# Patient Record
Sex: FEMALE | Race: WHITE | NOT HISPANIC OR LATINO | URBAN - METROPOLITAN AREA
[De-identification: names, ages, dates, MRNs, and addresses within clinical notes are randomized per-mention and may not be internally consistent; named-entity substitution may affect disease eponyms.]

---

## 2020-04-22 ENCOUNTER — LAB REQUISITION (OUTPATIENT)
Dept: LAB | Facility: HOSPITAL | Age: 85
End: 2020-04-22

## 2020-04-22 DIAGNOSIS — Z00.00 ROUTINE GENERAL MEDICAL EXAMINATION AT A HEALTH CARE FACILITY: ICD-10-CM

## 2020-04-22 LAB
ALBUMIN SERPL-MCNC: 3.6 G/DL (ref 3.5–5.2)
ALBUMIN/GLOB SERPL: 1.6 G/DL
ALP SERPL-CCNC: 85 U/L (ref 39–117)
ALT SERPL W P-5'-P-CCNC: 10 U/L (ref 1–33)
ANION GAP SERPL CALCULATED.3IONS-SCNC: 12.6 MMOL/L (ref 5–15)
AST SERPL-CCNC: 16 U/L (ref 1–32)
BASOPHILS # BLD AUTO: 0.08 10*3/MM3 (ref 0–0.2)
BASOPHILS NFR BLD AUTO: 1.1 % (ref 0–1.5)
BILIRUB SERPL-MCNC: 0.4 MG/DL (ref 0.2–1.2)
BUN BLD-MCNC: 22 MG/DL (ref 8–23)
BUN/CREAT SERPL: 31.9 (ref 7–25)
CALCIUM SPEC-SCNC: 8.8 MG/DL (ref 8.6–10.5)
CHLORIDE SERPL-SCNC: 99 MMOL/L (ref 98–107)
CO2 SERPL-SCNC: 25.4 MMOL/L (ref 22–29)
CREAT BLD-MCNC: 0.69 MG/DL (ref 0.57–1)
D DIMER PPP FEU-MCNC: 0.93 MCGFEU/ML (ref 0–0.49)
DEPRECATED RDW RBC AUTO: 42.7 FL (ref 37–54)
EOSINOPHIL # BLD AUTO: 0.36 10*3/MM3 (ref 0–0.4)
EOSINOPHIL NFR BLD AUTO: 4.8 % (ref 0.3–6.2)
ERYTHROCYTE [DISTWIDTH] IN BLOOD BY AUTOMATED COUNT: 12.9 % (ref 12.3–15.4)
GFR SERPL CREATININE-BSD FRML MDRD: 81 ML/MIN/1.73
GLOBULIN UR ELPH-MCNC: 2.2 GM/DL
GLUCOSE BLD-MCNC: 140 MG/DL (ref 65–99)
HCT VFR BLD AUTO: 35.7 % (ref 34–46.6)
HGB BLD-MCNC: 12.2 G/DL (ref 12–15.9)
IMM GRANULOCYTES # BLD AUTO: 0.02 10*3/MM3 (ref 0–0.05)
IMM GRANULOCYTES NFR BLD AUTO: 0.3 % (ref 0–0.5)
LYMPHOCYTES # BLD AUTO: 2.12 10*3/MM3 (ref 0.7–3.1)
LYMPHOCYTES NFR BLD AUTO: 28.1 % (ref 19.6–45.3)
MCH RBC QN AUTO: 30.7 PG (ref 26.6–33)
MCHC RBC AUTO-ENTMCNC: 34.2 G/DL (ref 31.5–35.7)
MCV RBC AUTO: 89.9 FL (ref 79–97)
MONOCYTES # BLD AUTO: 0.53 10*3/MM3 (ref 0.1–0.9)
MONOCYTES NFR BLD AUTO: 7 % (ref 5–12)
NEUTROPHILS # BLD AUTO: 4.43 10*3/MM3 (ref 1.7–7)
NEUTROPHILS NFR BLD AUTO: 58.7 % (ref 42.7–76)
NRBC BLD AUTO-RTO: 0 /100 WBC (ref 0–0.2)
PLATELET # BLD AUTO: 269 10*3/MM3 (ref 140–450)
PMV BLD AUTO: 9.4 FL (ref 6–12)
POTASSIUM BLD-SCNC: 3.9 MMOL/L (ref 3.5–5.2)
PROT SERPL-MCNC: 5.8 G/DL (ref 6–8.5)
RBC # BLD AUTO: 3.97 10*6/MM3 (ref 3.77–5.28)
SODIUM BLD-SCNC: 137 MMOL/L (ref 136–145)
WBC NRBC COR # BLD: 7.54 10*3/MM3 (ref 3.4–10.8)

## 2020-04-22 PROCEDURE — 85025 COMPLETE CBC W/AUTO DIFF WBC: CPT

## 2020-04-22 PROCEDURE — 85379 FIBRIN DEGRADATION QUANT: CPT

## 2020-04-22 PROCEDURE — 80053 COMPREHEN METABOLIC PANEL: CPT

## 2024-02-29 ENCOUNTER — OFFICE VISIT (OUTPATIENT)
Dept: FAMILY MEDICINE CLINIC | Facility: CLINIC | Age: 89
End: 2024-02-29
Payer: MEDICARE

## 2024-02-29 VITALS
HEART RATE: 76 BPM | OXYGEN SATURATION: 92 % | HEIGHT: 62 IN | DIASTOLIC BLOOD PRESSURE: 78 MMHG | SYSTOLIC BLOOD PRESSURE: 142 MMHG

## 2024-02-29 DIAGNOSIS — F03.B0 MODERATE DEMENTIA WITHOUT BEHAVIORAL DISTURBANCE, PSYCHOTIC DISTURBANCE, MOOD DISTURBANCE, OR ANXIETY, UNSPECIFIED DEMENTIA TYPE: ICD-10-CM

## 2024-02-29 DIAGNOSIS — J43.9 PULMONARY EMPHYSEMA, UNSPECIFIED EMPHYSEMA TYPE: ICD-10-CM

## 2024-02-29 DIAGNOSIS — I10 PRIMARY HYPERTENSION: ICD-10-CM

## 2024-02-29 DIAGNOSIS — S31.000A WOUND OF SACRAL REGION, INITIAL ENCOUNTER: Primary | ICD-10-CM

## 2024-02-29 DIAGNOSIS — L89.301 PRESSURE INJURY OF BUTTOCK, STAGE 1, UNSPECIFIED LATERALITY: ICD-10-CM

## 2024-02-29 PROBLEM — G30.1 SEVERE LATE ONSET ALZHEIMER'S DEMENTIA WITHOUT BEHAVIORAL DISTURBANCE, PSYCHOTIC DISTURBANCE, MOOD DISTURBANCE, OR ANXIETY: Status: ACTIVE | Noted: 2024-02-29

## 2024-02-29 PROBLEM — K21.9 GASTROESOPHAGEAL REFLUX DISEASE WITHOUT ESOPHAGITIS: Status: ACTIVE | Noted: 2024-02-29

## 2024-02-29 PROBLEM — F02.C0 SEVERE LATE ONSET ALZHEIMER'S DEMENTIA WITHOUT BEHAVIORAL DISTURBANCE, PSYCHOTIC DISTURBANCE, MOOD DISTURBANCE, OR ANXIETY: Status: ACTIVE | Noted: 2024-02-29

## 2024-02-29 PROBLEM — J44.9 COPD (CHRONIC OBSTRUCTIVE PULMONARY DISEASE): Status: ACTIVE | Noted: 2024-02-29

## 2024-02-29 RX ORDER — POLYETHYLENE GLYCOL 3350 17 G/17G
17 POWDER, FOR SOLUTION ORAL DAILY
COMMUNITY

## 2024-02-29 RX ORDER — ACETAMINOPHEN 325 MG/1
650 TABLET ORAL EVERY 6 HOURS PRN
COMMUNITY

## 2024-02-29 RX ORDER — POLYVINYL ALCOHOL 14 MG/ML
1 SOLUTION/ DROPS OPHTHALMIC AS NEEDED
COMMUNITY

## 2024-02-29 RX ORDER — ALBUTEROL SULFATE 90 UG/1
2 AEROSOL, METERED RESPIRATORY (INHALATION) EVERY 4 HOURS PRN
COMMUNITY
End: 2024-02-29 | Stop reason: SDUPTHER

## 2024-02-29 RX ORDER — TIMOLOL 5.12 MG/ML
1 SOLUTION/ DROPS OPHTHALMIC DAILY
Qty: 5 ML | Refills: 2 | Status: SHIPPED | OUTPATIENT
Start: 2024-02-29

## 2024-02-29 RX ORDER — VERAPAMIL HYDROCHLORIDE 80 MG/1
1 TABLET ORAL 3 TIMES DAILY
COMMUNITY
Start: 2024-01-19 | End: 2024-02-29 | Stop reason: SDUPTHER

## 2024-02-29 RX ORDER — DIVALPROEX SODIUM 250 MG/1
250 TABLET, DELAYED RELEASE ORAL
COMMUNITY
End: 2024-02-29

## 2024-02-29 RX ORDER — ASPIRIN 81 MG/1
TABLET, CHEWABLE ORAL
COMMUNITY

## 2024-02-29 RX ORDER — VIT C/E/ZN/COPPR/LUTEIN/ZEAXAN 250MG-90MG
1 CAPSULE ORAL
Qty: 180 TABLET | Refills: 2 | Status: SHIPPED | OUTPATIENT
Start: 2024-02-29

## 2024-02-29 RX ORDER — MULTIVIT WITH MINERALS/LUTEIN
250 TABLET ORAL DAILY
COMMUNITY

## 2024-02-29 RX ORDER — DICLOFENAC SODIUM 30 MG/G
2 GEL TOPICAL 3 TIMES DAILY
Qty: 60 G | Refills: 2 | Status: SHIPPED | OUTPATIENT
Start: 2024-02-29

## 2024-02-29 RX ORDER — KETOCONAZOLE 20 MG/G
CREAM TOPICAL
COMMUNITY
Start: 2023-11-06 | End: 2024-02-29 | Stop reason: SDUPTHER

## 2024-02-29 RX ORDER — LORATADINE 10 MG/1
10 TABLET ORAL DAILY
Qty: 30 TABLET | Refills: 2 | Status: SHIPPED | OUTPATIENT
Start: 2024-02-29

## 2024-02-29 RX ORDER — LORATADINE 10 MG/1
10 TABLET ORAL DAILY
COMMUNITY
End: 2024-02-29 | Stop reason: SDUPTHER

## 2024-02-29 RX ORDER — TIMOLOL 5.12 MG/ML
1 SOLUTION/ DROPS OPHTHALMIC
COMMUNITY
End: 2024-02-29 | Stop reason: SDUPTHER

## 2024-02-29 RX ORDER — FAMOTIDINE 20 MG/1
20 TABLET, FILM COATED ORAL DAILY
Qty: 90 TABLET | Refills: 2 | Status: SHIPPED | OUTPATIENT
Start: 2024-02-29

## 2024-02-29 RX ORDER — GABAPENTIN 100 MG/1
CAPSULE ORAL
COMMUNITY
End: 2024-02-29

## 2024-02-29 RX ORDER — DICLOFENAC SODIUM 30 MG/G
2 GEL TOPICAL
COMMUNITY
End: 2024-02-29 | Stop reason: SDUPTHER

## 2024-02-29 RX ORDER — UMECLIDINIUM BROMIDE AND VILANTEROL TRIFENATATE 62.5; 25 UG/1; UG/1
1 POWDER RESPIRATORY (INHALATION) DAILY
Qty: 1 EACH | Refills: 9 | Status: SHIPPED | OUTPATIENT
Start: 2024-02-29

## 2024-02-29 RX ORDER — ERGOCALCIFEROL (VITAMIN D2) 10 MCG
400 TABLET ORAL DAILY
COMMUNITY

## 2024-02-29 RX ORDER — UMECLIDINIUM BROMIDE AND VILANTEROL TRIFENATATE 62.5; 25 UG/1; UG/1
1 POWDER RESPIRATORY (INHALATION) DAILY
COMMUNITY
Start: 2024-02-15 | End: 2024-02-29 | Stop reason: SDUPTHER

## 2024-02-29 RX ORDER — ALBUTEROL SULFATE 90 UG/1
2 AEROSOL, METERED RESPIRATORY (INHALATION) EVERY 4 HOURS PRN
Qty: 18 G | Refills: 9 | Status: SHIPPED | OUTPATIENT
Start: 2024-02-29

## 2024-02-29 RX ORDER — GEL DRESSING 2" X 2"
1 BANDAGE TOPICAL DAILY
Qty: 60 EACH | Refills: 1 | Status: SHIPPED | OUTPATIENT
Start: 2024-02-29

## 2024-02-29 RX ORDER — ALBUTEROL SULFATE 2.5 MG/3ML
2.5 SOLUTION RESPIRATORY (INHALATION) EVERY 4 HOURS PRN
Qty: 120 EACH | Refills: 12 | Status: SHIPPED | OUTPATIENT
Start: 2024-02-29 | End: 2024-03-01 | Stop reason: SDUPTHER

## 2024-02-29 RX ORDER — VERAPAMIL HYDROCHLORIDE 80 MG/1
80 TABLET ORAL 3 TIMES DAILY
Qty: 90 TABLET | Refills: 2 | Status: SHIPPED | OUTPATIENT
Start: 2024-02-29

## 2024-02-29 RX ORDER — FAMOTIDINE 20 MG/1
20 TABLET, FILM COATED ORAL DAILY
COMMUNITY
End: 2024-02-29 | Stop reason: SDUPTHER

## 2024-02-29 RX ORDER — KETOCONAZOLE 20 MG/G
CREAM TOPICAL DAILY
Qty: 60 G | Refills: 2 | Status: SHIPPED | OUTPATIENT
Start: 2024-02-29

## 2024-02-29 RX ORDER — SIMETHICONE 80 MG
80 TABLET,CHEWABLE ORAL EVERY 6 HOURS PRN
COMMUNITY

## 2024-02-29 NOTE — PROGRESS NOTES
Kristine Hahn  1935  [id number]  Patient Care Team:  Juan Ramon Zepeda MD as PCP - General (Internal Medicine)    Kristine Hahn is a 88 y.o. female here today to establish care.  This patient is accompanied by their self who contributes to the history of their care.    Chief Complaint:    No chief complaint on file.       History of Present Illness:   She was recently was taken out of a nursing home. She has been in a nursing home since 2019. She was recently discharged from [facility] for pneumonia and uti. Was originally placed in  the nursing home for arthritis. She has been dx with dementia- per Dr. Fuentes. He has not been ambulatory for 5 years. She was taken off of zoloft by her family. She has been off of this for 1 week. He was taken off depakote for one week.   Family says she has been more alert and eating better off the medicatiosn and in new surrounding. She has been opn anoro and albuterol and has continued taking these.  Her caregiver indicates that she has a early pressure ulcer on her buttocks from being in the hospital in the nursing home.  They have applying cream.  She is in a wheelchair all days.  She indicates the skin is starting to slough.    Past Medical History:   Diagnosis Date    Anemia     Arthritis     Cancer     GERD (gastroesophageal reflux disease)     Glaucoma        History reviewed. No pertinent surgical history.     Family History   Problem Relation Age of Onset    Arthritis Mother     Arthritis Maternal Grandmother        Social History     Socioeconomic History    Marital status: Single   Tobacco Use    Smoking status: Never    Smokeless tobacco: Never   Vaping Use    Vaping Use: Never used   Substance and Sexual Activity    Alcohol use: Never    Drug use: Never    Sexual activity: Defer       Allergies   Allergen Reactions    Prednisone Unknown - Low Severity       Review of Systems:    Review of Systems   Constitutional: Negative.    Eyes: Negative.   "  Respiratory:  Positive for cough.    Genitourinary:         Wears diapers   Musculoskeletal:  Positive for arthralgias.   Skin:  Positive for skin lesions and bruise.   Neurological:  Positive for memory problem. Negative for confusion.        Does not ambulate   Psychiatric/Behavioral:  Negative for hallucinations and sleep disturbance.        Vitals:    02/29/24 1516   BP: 142/78   Pulse: 76   SpO2: 92%   Height: 157.5 cm (62\")     There is no height or weight on file to calculate BMI.      Current Outpatient Medications:     acetaminophen (TYLENOL) 325 MG tablet, Take 2 tablets by mouth Every 6 (Six) Hours As Needed for Mild Pain., Disp: , Rfl:     albuterol sulfate  (90 Base) MCG/ACT inhaler, Inhale 2 puffs Every 4 (Four) Hours As Needed for Wheezing., Disp: 18 g, Rfl: 9    aspirin 81 MG chewable tablet, Chew 1 tablet every day by oral route., Disp: , Rfl:     Diclofenac Sodium 3 % gel gel, Apply 2 g topically to the appropriate area as directed 3 (Three) Times a Day., Disp: 60 g, Rfl: 2    famotidine (PEPCID) 20 MG tablet, Take 1 tablet by mouth Daily., Disp: 90 tablet, Rfl: 2    iron polysacch complex-B12-FA (FERREX 150 FORTE) 150-0.025-1 MG capsule capsule, Take  by mouth Daily., Disp: , Rfl:     ketoconazole (NIZORAL) 2 % cream, Apply  topically to the appropriate area as directed Daily., Disp: 60 g, Rfl: 2    Latanoprost 0.005 % emulsion, Administer 1 drop to both eyes Daily., Disp: 5 mL, Rfl: 2    loratadine (CLARITIN) 10 MG tablet, Take 1 tablet by mouth Daily., Disp: 30 tablet, Rfl: 2    polyethylene glycol (MIRALAX) 17 g packet, Take 17 g by mouth Daily., Disp: , Rfl:     polyvinyl alcohol (LIQUIFILM) 1.4 % ophthalmic solution, 1 drop As Needed for Dry Eyes., Disp: , Rfl:     simethicone (MYLICON) 80 MG chewable tablet, Chew 1 tablet Every 6 (Six) Hours As Needed for Flatulence., Disp: , Rfl:     timolol (Betimol) 0.5 % ophthalmic solution, Administer 1 drop to both eyes Daily., Disp: 5 mL, " "Rfl: 2    Umeclidinium-Vilanterol (Anoro Ellipta) 62.5-25 MCG/ACT aerosol powder  inhaler, Inhale 1 puff Daily., Disp: 1 each, Rfl: 9    verapamil (CALAN) 80 MG tablet, Take 1 tablet by mouth 3 (Three) Times a Day., Disp: 90 tablet, Rfl: 2    vitamin C (ASCORBIC ACID) 250 MG tablet, Take 1 tablet by mouth Daily., Disp: , Rfl:     Vitamin D, Cholecalciferol, (CHOLECALCIFEROL) 10 MCG (400 UNIT) tablet, Take 1 tablet by mouth Daily., Disp: , Rfl:     albuterol (PROVENTIL) (2.5 MG/3ML) 0.083% nebulizer solution, Take 2.5 mg by nebulization Every 4 (Four) Hours As Needed for Wheezing., Disp: 120 each, Rfl: 12    Multiple Vitamins-Minerals (PreserVision AREDS 2) chewable tablet, Chew 1 each Daily With Breakfast & Dinner., Disp: 180 tablet, Rfl: 2    Wound Dressings (Medihoney Ca Alginate 2\"x2\") pads, Apply 1 patch topically Daily., Disp: 60 each, Rfl: 1    Physical Exam:    Physical Exam  Nursing note reviewed. Vitals reviewed: Chronically ill-appearing but not in distress.  Constitutional:       General: She is not in acute distress.     Appearance: She is well-developed. She is not diaphoretic.   HENT:      Head: Normocephalic and atraumatic.      Right Ear: External ear normal.      Left Ear: External ear normal.      Mouth/Throat:      Pharynx: No oropharyngeal exudate.   Eyes:      General: No scleral icterus.        Right eye: No discharge.      Conjunctiva/sclera: Conjunctivae normal.   Neck:      Thyroid: No thyromegaly.      Vascular: No JVD.      Trachea: No tracheal deviation.   Cardiovascular:      Rate and Rhythm: Normal rate and regular rhythm.      Heart sounds: Normal heart sounds.      Comments: PMI nondisplaced  Pulmonary:      Effort: Pulmonary effort is normal.      Breath sounds: Normal breath sounds. No wheezing or rales.      Comments: Prolonged expiratory phase  Abdominal:      General: Bowel sounds are normal.      Palpations: Abdomen is soft.      Tenderness: There is no abdominal tenderness. " "There is no guarding or rebound.   Musculoskeletal:      Cervical back: Normal range of motion and neck supple.      Comments: Lower extremity exam reveals extremely stiff almost contracted.  Bilateral knee tenderness medial lateral joint line.  No effusion.   Lymphadenopathy:      Cervical: No cervical adenopathy.   Skin:     General: Skin is warm and dry.      Capillary Refill: Capillary refill takes less than 2 seconds.      Coloration: Skin is not pale.      Findings: No rash.      Comments: Dorsum of wrist are slightly bruised from recent IVs.  Review of her buttocks revealed bilateral approximately a centimeter in diameter pressure sores.  Fibrinous exudate is present.  No cellulitic changes no foul odor.  This is bilateral.   Neurological:      Mental Status: She is alert.      Motor: No abnormal muscle tone.      Coordination: Coordination normal.      Comments: Perseverates with questions.  Formal mental status exam not taken   Psychiatric:         Judgment: Judgment normal.         Procedures    Results Review:    I reviewed the patient's new clinical results.  Reviewed discharge summary from [facility].    Assessment/Plan:    Problem List Items Addressed This Visit       COPD (chronic obstructive pulmonary disease)    Relevant Medications    Umeclidinium-Vilanterol (Anoro Ellipta) 62.5-25 MCG/ACT aerosol powder  inhaler    albuterol sulfate  (90 Base) MCG/ACT inhaler    loratadine (CLARITIN) 10 MG tablet    albuterol (PROVENTIL) (2.5 MG/3ML) 0.083% nebulizer solution    Other Relevant Orders    Ambulatory Referral to Home Health    Home Nebulizer    Primary hypertension    Relevant Medications    verapamil (CALAN) 80 MG tablet     Other Visit Diagnoses       Wound of sacral region, initial encounter    -  Primary    Relevant Medications    Wound Dressings (Medihoney Ca Alginate 2\"x2\") pads    Other Relevant Orders    Ambulatory Referral to Home Health    Ambulatory Referral to Case " Management Care Coordination, High Risk for ED/Readmission, Caregiving/Support    Pressure injury of buttock, stage 1, unspecified laterality        Relevant Orders    Ambulatory Referral to Home Health    Ambulatory Referral to Case Management Care Coordination, High Risk for ED/Readmission, Caregiving/Support    Moderate dementia without behavioral disturbance, psychotic disturbance, mood disturbance, or anxiety, unspecified dementia type        Relevant Orders    Ambulatory Referral to Case Management Care Coordination, High Risk for ED/Readmission, Caregiving/Support        That should offload her buttocks and turn every 2 hours.  Have set up home health for PT OT wound care and speech therapy.  Home nebulizer has been arranged.  Continue and have refilled her usual medicines.    Plan of care reviewed with patient at the conclusion of today's visit. Education was provided regarding diagnosis and management.  Patient verbalizes understanding of and agreement with management plan.    Return if symptoms worsen or fail to improve.    Juan Ramon Zepeda MD      Please note than portions of this note were completed [facility] a Voice Recognition Program

## 2024-03-01 ENCOUNTER — REFERRAL TRIAGE (OUTPATIENT)
Dept: CASE MANAGEMENT | Facility: OTHER | Age: 89
End: 2024-03-01
Payer: MEDICARE

## 2024-03-01 ENCOUNTER — PRIOR AUTHORIZATION (OUTPATIENT)
Dept: FAMILY MEDICINE CLINIC | Facility: CLINIC | Age: 89
End: 2024-03-01
Payer: MEDICARE

## 2024-03-01 ENCOUNTER — TELEPHONE (OUTPATIENT)
Dept: FAMILY MEDICINE CLINIC | Facility: CLINIC | Age: 89
End: 2024-03-01
Payer: MEDICARE

## 2024-03-01 DIAGNOSIS — J44.9 CHRONIC OBSTRUCTIVE PULMONARY DISEASE, UNSPECIFIED COPD TYPE: Primary | ICD-10-CM

## 2024-03-01 DIAGNOSIS — J43.9 PULMONARY EMPHYSEMA, UNSPECIFIED EMPHYSEMA TYPE: ICD-10-CM

## 2024-03-01 RX ORDER — ALBUTEROL SULFATE 2.5 MG/3ML
2.5 SOLUTION RESPIRATORY (INHALATION) EVERY 4 HOURS PRN
Qty: 120 EACH | Refills: 12 | Status: SHIPPED | OUTPATIENT
Start: 2024-03-01

## 2024-03-01 NOTE — TELEPHONE ENCOUNTER
(Key: [id number]) - [id number]  Diclofenac Sodium 3% gel  Status: PA Request  Created: March 1st, 2024  Sent: March 1st, 2024

## 2024-03-01 NOTE — TELEPHONE ENCOUNTER
Insurance did not approve the emphysema diagnosis originally attached. Per Dr Zepeda I have resent with COPD diagnosis code.      Juan Ramon Zepeda MD  You7 minutes ago (12:48 PM)       Copd, emphysema. It was associated on the prescriptions

## 2024-03-01 NOTE — TELEPHONE ENCOUNTER
Caller: [facility] [id number] - [place] [address] AT [address] - [phone]  - [phone] FX    Relationship: Pharmacy    Best call back number: [phone]    Which medication are you concerned about: NEBULIZER SOLUTION    Who prescribed you this medication: DR BADILLO    What are your concerns: PHARMACY WOULD LIKE IC10 CODE FOR PRESCRIPTION SO INSURANCE WILL PAY FOR IT. THE CODE THAT IS ON THERE INSURANCE WILL NOT ACCEPT . PLEASE ADVISE

## 2024-03-02 ENCOUNTER — HOME HEALTH ADMISSION (OUTPATIENT)
Dept: HOME HEALTH SERVICES | Facility: HOME HEALTHCARE | Age: 89
End: 2024-03-02
Payer: MEDICARE

## 2024-03-04 ENCOUNTER — TELEPHONE (OUTPATIENT)
Dept: FAMILY MEDICINE CLINIC | Facility: CLINIC | Age: 89
End: 2024-03-04
Payer: MEDICARE

## 2024-03-04 DIAGNOSIS — J44.9 CHRONIC OBSTRUCTIVE PULMONARY DISEASE, UNSPECIFIED COPD TYPE: ICD-10-CM

## 2024-03-04 DIAGNOSIS — I10 PRIMARY HYPERTENSION: ICD-10-CM

## 2024-03-04 DIAGNOSIS — S31.000A WOUND OF SACRAL REGION, INITIAL ENCOUNTER: ICD-10-CM

## 2024-03-04 DIAGNOSIS — J43.9 PULMONARY EMPHYSEMA, UNSPECIFIED EMPHYSEMA TYPE: Primary | ICD-10-CM

## 2024-03-04 DIAGNOSIS — F03.B0 MODERATE DEMENTIA WITHOUT BEHAVIORAL DISTURBANCE, PSYCHOTIC DISTURBANCE, MOOD DISTURBANCE, OR ANXIETY, UNSPECIFIED DEMENTIA TYPE: ICD-10-CM

## 2024-03-04 DIAGNOSIS — L89.301 PRESSURE INJURY OF BUTTOCK, STAGE 1, UNSPECIFIED LATERALITY: ICD-10-CM

## 2024-03-04 NOTE — TELEPHONE ENCOUNTER
The patient's son called. He recently took his mother out of [facility] because he was not happy with the care. She is a new patient with Dr. Zepeda on 02/29/24. She had a new Caregiver at the time she was seen, but he has since taken her out of that Caregiver's home because she was 'yellilng' at his mom.  He has her with him at his house now, but he works and he can't take care of her.  He would like a Referral from Dr. Zepeda to [facility] in [place]. They are requesting this Referral with Office Note, Medication List, and anything else needed so that she may be a resident of that home.    Please fax this information to their secure fax # [phone].  Their phone # is [phone]

## 2024-03-05 ENCOUNTER — HOME CARE VISIT (OUTPATIENT)
Dept: HOME HEALTH SERVICES | Facility: HOME HEALTHCARE | Age: 89
End: 2024-03-05
Payer: MEDICARE

## 2024-03-05 ENCOUNTER — TELEPHONE (OUTPATIENT)
Dept: FAMILY MEDICINE CLINIC | Facility: CLINIC | Age: 89
End: 2024-03-05
Payer: MEDICARE

## 2024-03-05 ENCOUNTER — PATIENT OUTREACH (OUTPATIENT)
Dept: CASE MANAGEMENT | Facility: OTHER | Age: 89
End: 2024-03-05
Payer: MEDICARE

## 2024-03-05 VITALS
DIASTOLIC BLOOD PRESSURE: 78 MMHG | SYSTOLIC BLOOD PRESSURE: 130 MMHG | RESPIRATION RATE: 16 BRPM | OXYGEN SATURATION: 94 % | TEMPERATURE: 97.8 F | HEART RATE: 68 BPM

## 2024-03-05 PROCEDURE — G0299 HHS/HOSPICE OF RN EA 15 MIN: HCPCS

## 2024-03-05 NOTE — OUTREACH NOTE
AMBULATORY CASE MANAGEMENT NOTE    Name and Relationship of Patient/Support Person: JASON FERNANDES - Emergency Contact  Emergency Contact    Patient Outreach    RN-MARISABELM received Ambulatory Case Management Referral from PCP, Dr. SHELL Zepeda.  Called patient, denver Vazquez answered and talked.  Explained role of RN-MARISABELM and contact information given.  Son stated he has been on vacation since his mother came to his home; he is taking this time to decide if patient needs to go back to a different nursing home than the one he took her out of; he is taking care of her needs at this time, but he is still employed. Son has asked PCP, Dr. Zepeda to refer patient to go to [facility].  Son said Home Health nurse has been there and will be coming; a  will be coming to talk with him too.  Son said he needs to talk with this SW, before he decides if he will retire early and stay home to take care of his mother, or place her into a Ns Home.  Discussed options of getting extra help in his home:  Community Agencies who provide hired caregivers in the home;  [employer] family who may be able to assist;  caregivers recommended by close trusted friends.  Son said he is gathering information right now, and will weigh all options before making a decision.      This note will be routed to the PCP.     Adult Patient Profile  Questions/Answers      Flowsheet Row Most Recent Value   Symptoms/Conditions Managed at Home musculoskeletal  [Patient is now at son's home, after being in a nursing home for several years.  He said patient is dependent for all care needs, bedbound.]   Barriers to Managing Health other (see comments)  [Patient is dependent on others for all care needs,  is non-ambulatory x 5 years.]   Musculoskeletal Symptoms/Conditions mobility limited  [Son said patient is not able to move her legs.]   Barriers to Taking Medication as Prescribed none  [Son assists with medications]   Source of  Information family, health record  [spoke with eGorge goff.]   Primary Source of Support/Comfort child(gifty)   Name of Support/Comfort Primary Source Is staying with George Goff   People in Home child(gifty), adult   Name(s) of People in Home George goff   Current Living Arrangements home   Resource/Environmental Concerns none            Indigo THAPA  Ambulatory Case Management    3/5/2024, 16:44 EST

## 2024-03-05 NOTE — TELEPHONE ENCOUNTER
Caller: [facility]    Relationship:     Best call back number: [phone]    What is the best time to reach you: ANY    Who are you requesting to speak with (clinical staff, provider,  specific staff member): NURSE    Do you know the name of the person who called: OMAR    What was the call regarding: [facility] WOULD LIKE AN ORDER FOR  TO VISIT PATIENT.  PLEASE CALL WITH VERBAL ORDER.      Is it okay if the provider responds through MyChart: PHONE CALL PLEASE

## 2024-03-05 NOTE — HOME HEALTH
"SOC Note:    Home Health ordered for: disciplines sn, pt, st, ot. Called for MSW referral- awaiting verbal order    Reason for Hosp/Primary Dx/Co-morbidities: Patient is a 88yr old female. PCP referral from Dr Zepeda. Pt had resided at [facility] due to dependent needs. Admitted to [facility] for UTI, PNA. PMH: EMPHYSEMA, CAD, H/O UTI AND PNA, DEMENTIA, COPD, HTN, SEIZURE D/O, PRIOR CVA. Pt was living, at one time, with a caregiver named Rosaura. Son and Rosaura had an argument and son took pt to live with him. Home is very cluttered. Pt is on a mattress on the floor. Son reports he had bought a hospital bed but Rosaura will not let him have it. Pt is dependent in all needs and has not walked in \"years\". Son reports he is \"waiting for a bed at [facility] in [place] for patient. It's suppose to be ready next week.\" Isidra is checking on this.     Focus of Care: wound care, stage 2 pressure injuries to sacral spine,  medication management    Patient's goal(s):\"bottom to heal\"    Current Functional status/mobility/DME: w/c- pt has not walked in years    HB status/Living Arrangements: lives with son    Skin Integrity/wound status: sacral spine,stage 2 pressure injuries    Code Status: CPR    Fall Risk/Safety concerns: High risk    Educated on Emergency Plan, steps to take prior to going to the ER and when to Call Home Health First: yes    Medication issues/Concerns: son to purchase ASA and vitamin c    Additional Problems/Concerns: Pt is dependent. Son is \"attempting\" to care for pt but home situation is not adequate and very cluttered. Son is on \"vacation\" but plans to go back to work. (He works at [employer]) Patient can not be left alone.     SDOH Barriers (i.e. caregiver concerns, social isolation, transportation, food insecurity, environment, income etc.)/Need for MSW: na    Plan for next visit: supplies ordered / perform wound care/ educate son."

## 2024-03-06 ENCOUNTER — HOME CARE VISIT (OUTPATIENT)
Dept: HOME HEALTH SERVICES | Facility: HOME HEALTHCARE | Age: 89
End: 2024-03-06
Payer: MEDICARE

## 2024-03-06 PROCEDURE — G0155 HHCP-SVS OF CSW,EA 15 MIN: HCPCS

## 2024-03-06 NOTE — TELEPHONE ENCOUNTER
Juan Ramon Zepeda MD  You; [facility] Clinical Pool1 hour ago (11:29 AM)       Ok for verbal   Juliana given verbal order from Dr. Zepeda

## 2024-03-06 NOTE — TELEPHONE ENCOUNTER
Juliana from [facility] called again today just to see if anything had been done yet. I explained it was late yesterday and Dr. Zepeda had not responded yet. She asked this to be put high priority because they wanted to see patient today.

## 2024-03-06 NOTE — HOME HEALTH
Met with patient and her son, George.  She was in different nursing homes since 2019. George states she was being neglected in the nursing homes and he doesn't want her to have to go back but in the same conversation, he states he is still working 40 hours per week and doesn't want to retire yet.  He works at [employer] in [place].  Ms. Hahn is totally dependent and bed bound.  He said she hasn't walked in 5 years.  He said she was admitted to the nursing home initially because of arthirtis in her hip.  She also has a diagnosis of dementia.  She is able to answer questions appropriately and is able to state her .  George answered questions for her and I had to ask that he let her talk.  He said that when he took her out of [facility] where she had been for a year and a half, she was at her friend's home (Rosaura) but he states that Rosaura is crazy and she was yelling at her Ms. Hahn and had left her alone and he will not allow her to go back there.  Ms. Hahn is laying on a mattress on the floor in the front room of the home.  The hospital bed is there but not put together.  George states he and his friend will put it together Friday night.  The home is very cluttered and dirty.  Discussed with George the need to de-clutter and clear space for Ms. Hahn's care and for  staff to be able to work with her.  He states he will work on it.  He states he owns the home and the only bills he has is utilities.  He states he went to social security last week to inquire about her check but they needed a form to be faxed from [facility].  We tried to call social security while I was there but were put on hold and a representative never got on the phone.  I provided George with resources including a sitter list, transportation, pathways book of resources, [facility] resources, lifeline alert options.  He states he cannot afford to hire help in the home.  Discussed that he cannot leave his mother alone at any  time.  He states he understands this.  He said he has siblings and a niece but noone helps him.  He is POA and has the paperwork.  He is agreeable to a waiver referral and states his mother does have Medicare and medicaid.  [facility] and told him to expect a phone call from [facility] in a couple of weeks.  However, the waiver program is currently on hold and I am not sure when it will be re-started or even is she will qualify so he will need to make alternate plans for in home assistance for when he returns to work.  He is currently on vacation but is supposed to return to work next week. I told him I had talked to Ashleigh at [facility] in [place] and they do not have any beds and indicated that they will not be taking his mother back because she had been there before and he had taked her out because he wasn't happy with her care there.   He asked me to contact the nursing home in [facility] re: long term care bed availabilty.  (and also [facility] and [facility] if that one doesn't work out.)  I told him I will follow up on this but since he has taken her out of several facilities, it may be difficult to get her placed.  He said he has been told this when he has called them himself and he understands. I asked him if he is able to take care of his mother and he said he is changing her diapers, applying the cream to her wound, giving her medications, and trying to get her to eat (he states she doesn't eat much).  He said he can take her to the appointments but he isn't aware of any that she has.  He said he also has a transportaton # ([facility] to use if needed).  I explained that he is responsible for her care since she is now home with him.  He said he understands.     After I left, I called the social security office and was told that George needs to go to the local office with his ID and his mother's discharge papers from the nursing home to apply as her payee and he will need to  open a bank account in her name and in his. I called and told George this and he said he will follow up on this.  I also called [facility] and spoke to the admissions office, Klarissa, and faxed Ms. Hahn' information to her at [phone].  She said they will review the information and will let me know if they can accept her.   I will follow up.

## 2024-03-07 ENCOUNTER — TELEPHONE (OUTPATIENT)
Dept: FAMILY MEDICINE CLINIC | Facility: CLINIC | Age: 89
End: 2024-03-07
Payer: MEDICARE

## 2024-03-07 ENCOUNTER — HOME CARE VISIT (OUTPATIENT)
Dept: HOME HEALTH SERVICES | Facility: HOME HEALTHCARE | Age: 89
End: 2024-03-07
Payer: MEDICARE

## 2024-03-07 VITALS
SYSTOLIC BLOOD PRESSURE: 114 MMHG | HEART RATE: 63 BPM | OXYGEN SATURATION: 92 % | DIASTOLIC BLOOD PRESSURE: 63 MMHG | RESPIRATION RATE: 16 BRPM | TEMPERATURE: 97.8 F

## 2024-03-07 VITALS
DIASTOLIC BLOOD PRESSURE: 63 MMHG | OXYGEN SATURATION: 92 % | TEMPERATURE: 97.8 F | RESPIRATION RATE: 16 BRPM | HEART RATE: 63 BPM | SYSTOLIC BLOOD PRESSURE: 114 MMHG

## 2024-03-07 PROCEDURE — G0151 HHCP-SERV OF PT,EA 15 MIN: HCPCS

## 2024-03-07 PROCEDURE — G0153 HHCP-SVS OF S/L PATH,EA 15MN: HCPCS

## 2024-03-07 PROCEDURE — G0152 HHCP-SERV OF OT,EA 15 MIN: HCPCS

## 2024-03-07 NOTE — Clinical Note
"ST Eval Note:     Home Health ordered for:  ST services referred to ST services following a visit from PCP secondary to hx of dementia.     Reason for Hosp/Primary Dx/Co-morbidities: Dementia, sacral wound, emphysema  .   Focus of Care: ST evaluation only    Patient's goal(s): \"to go back to a nursing home\"    Current Functional status/mobility/DME: Pt. is completely bedbound and dependent for all care and needs. Pt's son has a hospital bed but has not set it up at time of evaluation.  Son states she has a wheelchair, one was not visible in the home.     HB status/Living Arrangements: Pt. is homebound as she is completely bedbound, unable to transfer/ambulate.  She is dependent for all care.      Fall Risk/Safety concerns: Environmental concerns and physical concerns as pt's son is not able to care for her in his home.  He works a full time job and will have to leave her alone for 8+ hours a day with no assistance.  Pt. would not be able to exit the home in case of a fire.      Plan for next visit:  No further visits indicated    Pt.'s son states pt. has been in a skilled nursing facility since 2019 but he decided to remove her because they were serving her cold food.  He states that he is not able to care for her in his own home as he still works 40+ hours a week.  Pt. would be left alone while he is gone as he cannot afford private duty sitters.  Pt. has a sacral wound and is not able to turn or reposition herself, she is completely dependent for all BADLs and IADLs.  When asked if she felt safe in the home, the pt. replied, \"he is my son, but I want to go back to a nursing home.\" Son is not performing ADLs routinely and pt. is at risk of further skin breakdown, pneumonia, rehospitalization due to lack of care.   "

## 2024-03-07 NOTE — HOME HEALTH
"ST Eval Note:     Home Health ordered for:  ST services referred to ST services following a visit from PCP secondary to hx of dementia.     Reason for Hosp/Primary Dx/Co-morbidities: Dementia, sacral wound, emphysema  .   Focus of Care: ST evaluation only    Patient's goal(s): \"to go back to a nursing home\"    Current Functional status/mobility/DME: Pt. is completely bedbound and dependent for all care and needs. Pt's son has a hospital bed but has not set it up at time of evaluation.  Son states she has a wheelchair, one was not visible in the home.     HB status/Living Arrangements: Pt. is homebound as she is completely bedbound, unable to transfer/ambulate.  She is dependent for all care.      Fall Risk/Safety concerns: Environmental concerns and physical concerns as pt's son is not able to care for her in his home.  He works a full time job and will have to leave her alone for 8+ hours a day with no assistance.  Pt. would not be able to exit the home in case of a fire.      Plan for next visit:  No further visits indicated    Pt.'s son states pt. has been in a skilled nursing facility since 2019 but he decided to remove her because they were serving her cold food.  He states that he is not able to care for her in his own home as he still works 40+ hours a week.  Pt. would be left alone while he is gone as he cannot afford private duty sitters.  Pt. has a sacral wound and is not able to turn or reposition herself, she is completely dependent for all BADLs and IADLs.  When asked if she felt safe in the home, the pt. replied, \"he is my son, but I want to go back to a nursing home.\" Son is not performing ADLs routinely and pt. is at risk of further skin breakdown, pneumonia, rehospitalization due to lack of care."

## 2024-03-07 NOTE — HOME HEALTH
89 y/o F with recent hospital admission for acute hypoxic respiratory failure secondary to community-acquired pneumonia UTI induced encephalopathy. Pt. seen today for OT evaluation and has no further skilled OT needs at this time secondary to pt. is at baseline level requiring total assist with all ADLs/IADLs at home. Pts son stated she has required total care/assist with all ADLs for over 5 years. Caregiver educated on basic home safety/ patient needs, etc. APS referral #[id number].  PMH: dementia, COPD, CAD, hypertension, seizure disorder, prior CVA

## 2024-03-07 NOTE — TELEPHONE ENCOUNTER
Caller: LEO FERNANDES    Relationship: SON    Best call back number: [phone]    What is the best time to reach you: ANYTIME, ASAP    Who are you requesting to speak with (clinical staff, provider,  specific staff member): PROVIDER    What was the call regarding: HE HAS SOME VERY STRONG CONCERNS REGARDING HIS MOTHER'S CARE AND WOULD LIKE TO SPEAK WITH DR BADILLO AND GET SOME HELP AND INSTRUCTION

## 2024-03-08 ENCOUNTER — HOME CARE VISIT (OUTPATIENT)
Dept: HOME HEALTH SERVICES | Facility: HOME HEALTHCARE | Age: 89
End: 2024-03-08
Payer: MEDICARE

## 2024-03-08 VITALS
HEART RATE: 68 BPM | DIASTOLIC BLOOD PRESSURE: 58 MMHG | OXYGEN SATURATION: 93 % | SYSTOLIC BLOOD PRESSURE: 116 MMHG | RESPIRATION RATE: 16 BRPM | TEMPERATURE: 97.4 F

## 2024-03-08 PROCEDURE — G0300 HHS/HOSPICE OF LPN EA 15 MIN: HCPCS

## 2024-03-08 NOTE — TELEPHONE ENCOUNTER
LEO FERNANDES PATIENT'S SON IS CALLING TO CHECK ON THE MESSAGE HE LEFT YESTERDAY     HE IS REQUESTING A CALL BACK ABOUT HIS MOM     LEO FERNANDES       [phone]

## 2024-03-08 NOTE — HOME HEALTH
Routine Visit Note: LPN    Skill/education provided: wound care /assessment/ pt is bedbound pt living with son. pt has demtia and is asking if she can live someone else. wound care as ordered. pt tolerated procedure well. instructed son to turn and reposition pt every 2 hours to prevent skin breakdown. v/s wdl. lungs are CTA lobe. Reviewed medications with son no changes reported    Patient/caregiver response: son will turn and repostion pt every 2 hours to prevent pi's, son will give pt her medications as ordered. Son will call [facility] if any s/s of acute distress.    Plan for next visit: wound care    Other pertinent info:

## 2024-03-09 NOTE — HOME HEALTH
"Eval Note    87 y/o F with recent hospital admission for acute hypoxic respiratory failure secondary to community-acquired pneumonia UTI induced encephalopathy. Patient did reside in a nursing facility just prior to this most recent hospitalization.  Son had some concerns regarding the care, and therefore opted to bring her to his home.  The home setting is a cluttered environment with a hospital bed present sitting on its side in the living room as he reports he has not put the bed together yet.  She is currently sleeping on an air mattress in the living room.  Patient seen this date for PT evaluation only at this time as she appears to be at her functional baseline of dependent/total assist with all ADLs, dependent for all transferrs and bed mobility tasks.  Per son, she is non ambulatory and has been for the past 5 years per son's report.  Patient is completely dependent for positioning in bed/bed mobility and unable to bear weight on either LE.    PMH: dementia, COPD, CAD, hypertension, seizure disorder, prior hx of CVA    Patient's caregiver's goal(s): \"I want to get her into a good nursing home somewhere\"."

## 2024-03-12 ENCOUNTER — HOME CARE VISIT (OUTPATIENT)
Dept: HOME HEALTH SERVICES | Facility: HOME HEALTHCARE | Age: 89
End: 2024-03-12
Payer: MEDICARE

## 2024-03-12 ENCOUNTER — PATIENT OUTREACH (OUTPATIENT)
Dept: CASE MANAGEMENT | Facility: OTHER | Age: 89
End: 2024-03-12
Payer: MEDICARE

## 2024-03-12 ENCOUNTER — TELEPHONE (OUTPATIENT)
Dept: FAMILY MEDICINE CLINIC | Facility: CLINIC | Age: 89
End: 2024-03-12
Payer: MEDICARE

## 2024-03-12 VITALS
RESPIRATION RATE: 16 BRPM | DIASTOLIC BLOOD PRESSURE: 62 MMHG | OXYGEN SATURATION: 94 % | SYSTOLIC BLOOD PRESSURE: 116 MMHG | HEART RATE: 62 BPM | TEMPERATURE: 97.4 F

## 2024-03-12 PROCEDURE — G0299 HHS/HOSPICE OF RN EA 15 MIN: HCPCS

## 2024-03-12 NOTE — TELEPHONE ENCOUNTER
PATIENTS SON CALLED WITH CONCERNS FOR HIS MOTHER, UNFORTUNATELY HE IS NOT ON THE VERBAL SO WE COULDN'T GIVE OUT ANY INFORMATION.    HIS CONCERNS WERE THAT HIS MOTHER ISNT GETTING THE STABILITY/CARE THAT SHE NEEDS AND COULD POSSIBLY BE BEING PUT IN AN SITUATION THAT WOULDN'T BE GOOD FOR HER HEALTH. HE STATED THAT HIS BROTHER HAS PLACED HER IN MULTIPLE NURSING HOMES OVER THAT LAST FEW YEAR AND THE LAST HE'S HEARD HIS BROTHER HAS NOW MOVED HER INTO HIS HOME, HE STATED THAT WITH WHAT HE KNEW OF HIS MOTHERS HEALTH PREVIOUSLY HE DOESN'T BELIEVE THAT HIS BROTHER IS CAPABLE OF GIVING HER THE CARE SHE NEEDS.

## 2024-03-12 NOTE — TELEPHONE ENCOUNTER
Called pts son back , no answer so I left  with office number for call back.        What are the patient's questions?  Case management has been in discussion with the patient's son.     OK FOR HUB TO RELAY AND DOCUMENT.

## 2024-03-12 NOTE — HOME HEALTH
"\[facility]\"" bed set- up. continues to look for a nursing home. Not going to be able to quit work. instructed pt would need to go back to \[facility]\"" and reports\" im not doing that\". Instructed cg/pt on \"good sources of protein\". George cg, reports feeding pt \"hotdogs, bologna, tomato soup\". Instructed cg on wound care and turning and repositioning q2hrs. Verbalized understanding. Plan for next sn visit as scheduled."

## 2024-03-12 NOTE — OUTREACH NOTE
AMBULATORY CASE MANAGEMENT NOTE    Name and Relationship of Patient/Support Person: JASON FERNANDES - Emergency Contact  Emergency Contact    Patient Outreach    RN-ACM called patient's sonJason, whom patient is living with currently. Son stated that Home Health services have been very helpful;  nurse coming several times/week; the  has been working on getting patient in a nursing home, has sent a referral through [facility] for Medicaid Waiver; and has examined resources needed; HH PT, OT and ST coming also.  Son said the  Nurse showed him the proper way to do wound care and he is doing wound care as instructed.  Son stated he was told the wound was looking good.  Reviewed s/sx infection to report. Reviewed need to turn patient, change her position, every 2 hours.  Son stated he is doing this.  Son stated he administers the daily medications to patient, and knows she is taking them as ordered.  He reported that patient cooperates with him; she sometimes thinks he is her  or another family member. Son stated he makes sure patient has food every day, 3 or more meals per day.  Said patient does not eat a lot at once, so sometimes gives more food between meal times. Reviewed options of getting additional help in the home with patient's care.  Son said they cannot afford to pay out of pocket for caregiver; there is no other family member willing to help him.  Son voiced appreciation for the [facility] assistance.     Education Documentation  Skin Injury Prevention Measures, taught by Indigo Garcia, RN at 3/12/2024 12:57 PM.  Learner: Family  Readiness: Acceptance  Method: Explanation  Response: Verbalizes Understanding    Fluid/Food Intake, taught by Indigo Garcia RN at 3/12/2024 12:57 PM.  Learner: Family  Readiness: Acceptance  Method: Explanation  Response: Verbalizes Understanding    Basic Skin Care, taught by Indigo Garcia, RN at 3/12/2024 12:57 PM.  Learner:  Family  Readiness: Acceptance  Method: Explanation  Response: Verbalizes Understanding          Indigo THAPA  Ambulatory Case Management    3/12/2024, 12:57 EDT

## 2024-03-13 ENCOUNTER — HOME CARE VISIT (OUTPATIENT)
Dept: HOME HEALTH SERVICES | Facility: HOME HEALTHCARE | Age: 89
End: 2024-03-13
Payer: MEDICARE

## 2024-03-14 ENCOUNTER — HOME CARE VISIT (OUTPATIENT)
Dept: HOME HEALTH SERVICES | Facility: HOME HEALTHCARE | Age: 89
End: 2024-03-14
Payer: MEDICARE

## 2024-03-15 ENCOUNTER — HOME CARE VISIT (OUTPATIENT)
Dept: HOME HEALTH SERVICES | Facility: HOME HEALTHCARE | Age: 89
End: 2024-03-15
Payer: MEDICARE

## 2024-03-18 ENCOUNTER — HOME CARE VISIT (OUTPATIENT)
Dept: HOME HEALTH SERVICES | Facility: HOME HEALTHCARE | Age: 89
End: 2024-03-18
Payer: MEDICARE

## 2024-03-18 NOTE — HOME HEALTH
Transfer Summary:    - Patient transferred to SNF  - Reason for transfer: unable to be cared for at home by son  - Report called to-NA- patient was transferrred with assistance of MSW and notes sent to facility.    - Summary of Care, treatment or services provided to the patient including disciplines seeing the patient: SN for wound care, medication education, safety teaching. PT for evaluation, SLP for evaluation only     - Patient progress toward goals: minimal progression due to transfer to a facility for more appropriate level of care.     - Communicable Disease? No

## 2024-04-09 ENCOUNTER — PATIENT OUTREACH (OUTPATIENT)
Dept: CASE MANAGEMENT | Facility: OTHER | Age: 89
End: 2024-04-09
Payer: MEDICARE

## 2024-04-09 NOTE — OUTREACH NOTE
AMBULATORY CASE MANAGEMENT NOTE    Name and Relationship of Patient/Support Person: JASON FERNANDES - Emergency Contact  Emergency Contact    Care Coordination    RN-ACM called patient's son, but there was no answer, and no voicemail available.    RN-YANETH called [facility].  Was informed that patient went to a nursing home, [facility], on 3/14/24, for long term care.     Indigo THAPA  Ambulatory Case Management    4/9/2024, 11:46 EDT

## 2024-06-27 ENCOUNTER — TRANSCRIBE ORDERS (OUTPATIENT)
Dept: ADMINISTRATIVE | Facility: HOSPITAL | Age: 89
End: 2024-06-27
Payer: MEDICARE

## 2024-06-27 DIAGNOSIS — J44.9 CHRONIC OBSTRUCTIVE PULMONARY DISEASE, UNSPECIFIED COPD TYPE: Primary | ICD-10-CM

## 2024-07-19 ENCOUNTER — LAB REQUISITION (OUTPATIENT)
Dept: LAB | Facility: HOSPITAL | Age: 89
End: 2024-07-19
Payer: MEDICARE

## 2024-07-19 DIAGNOSIS — H44.513 ABSOLUTE GLAUCOMA, BILATERAL: ICD-10-CM

## 2024-07-19 LAB
BACTERIA UR QL AUTO: ABNORMAL /HPF
BILIRUB UR QL STRIP: NEGATIVE
CLARITY UR: ABNORMAL
COLOR UR: YELLOW
GLUCOSE UR STRIP-MCNC: NEGATIVE MG/DL
HGB UR QL STRIP.AUTO: ABNORMAL
HYALINE CASTS UR QL AUTO: ABNORMAL /LPF
KETONES UR QL STRIP: NEGATIVE
LEUKOCYTE ESTERASE UR QL STRIP.AUTO: ABNORMAL
NITRITE UR QL STRIP: NEGATIVE
PH UR STRIP.AUTO: 5.5 [PH] (ref 5–8)
PROT UR QL STRIP: NEGATIVE
RBC # UR STRIP: ABNORMAL /HPF
REF LAB TEST METHOD: ABNORMAL
SP GR UR STRIP: 1.01 (ref 1–1.03)
SQUAMOUS #/AREA URNS HPF: ABNORMAL /HPF
TRANS CELLS #/AREA URNS HPF: ABNORMAL /HPF
UROBILINOGEN UR QL STRIP: ABNORMAL
WBC # UR STRIP: ABNORMAL /HPF

## 2024-07-19 PROCEDURE — 81001 URINALYSIS AUTO W/SCOPE: CPT | Performed by: FAMILY MEDICINE

## 2024-07-19 PROCEDURE — 87086 URINE CULTURE/COLONY COUNT: CPT | Performed by: FAMILY MEDICINE

## 2024-07-21 LAB — BACTERIA SPEC AEROBE CULT: ABNORMAL

## 2024-08-22 ENCOUNTER — HOSPITAL ENCOUNTER (OUTPATIENT)
Dept: CT IMAGING | Facility: HOSPITAL | Age: 89
Discharge: HOME | End: 2024-08-22
Payer: MEDICARE

## 2024-08-22 DIAGNOSIS — J44.9 CHRONIC OBSTRUCTIVE PULMONARY DISEASE, UNSPECIFIED COPD TYPE: ICD-10-CM

## 2024-09-11 ENCOUNTER — PATIENT OUTREACH (OUTPATIENT)
Dept: CASE MANAGEMENT | Facility: OTHER | Age: 89
End: 2024-09-11
Payer: MEDICARE

## 2024-09-11 NOTE — OUTREACH NOTE
AMBULATORY CASE MANAGEMENT NOTE    Names and Relationships of Patient/Support Persons: Contact: JASON FERNANDES; Relationship: Emergency Contact -     Patient Outreach    RN-ACM received a call from patient's son, Blade.  He said his mother went into the nursing home in March of this year.  He said patient wants to come home; the son has to work, night shift.  He said there are no other family members to help him with patient's care. Son is requesting help with getting caregivers in the home, during about 10 hours at night, if he brings patient home from the [facility].  Encouraged son not to bring patient out of the nursing home to his home, unless he has Caregivers set up ready to help him.    Explained about Community agencies that you can hire caregivers for the patient in the home.  Son said they cannot afford to pay for the caregivers.  Explained that Medicare does not cover costs of non-medical caregivers in the home.  Discussed possible Medicaid Waiver process.  Son said he would like to talk with the [facility]- about what is possible for coverage of the caregivers in home.    RN-ACM sent Referral to [facility]-.        Indigo THAPA  Ambulatory Case Management    9/11/2024, 14:53 EDT

## 2024-09-13 ENCOUNTER — PATIENT OUTREACH (OUTPATIENT)
Age: 89
End: 2024-09-13
Payer: MEDICARE

## 2024-09-13 NOTE — OUTREACH NOTE
SW attempted an outreach, no voicemail available. TAHIR will attempt again in two days.  Arielle RUBIN -   Ambulatory Case Management    9/13/2024, 11:22 EDT

## 2024-09-19 ENCOUNTER — PATIENT OUTREACH (OUTPATIENT)
Dept: CASE MANAGEMENT | Facility: OTHER | Age: 89
End: 2024-09-19
Payer: MEDICARE

## 2024-09-20 ENCOUNTER — PATIENT OUTREACH (OUTPATIENT)
Age: 89
End: 2024-09-20
Payer: MEDICARE

## 2025-06-28 ENCOUNTER — APPOINTMENT (OUTPATIENT)
Dept: CT IMAGING | Facility: HOSPITAL | Age: OVER 89
End: 2025-06-28
Payer: MEDICARE

## 2025-06-28 ENCOUNTER — APPOINTMENT (OUTPATIENT)
Dept: GENERAL RADIOLOGY | Facility: HOSPITAL | Age: OVER 89
End: 2025-06-28
Payer: MEDICARE

## 2025-06-28 ENCOUNTER — HOSPITAL ENCOUNTER (INPATIENT)
Facility: HOSPITAL | Age: OVER 89
LOS: 5 days | Discharge: NURSING, REHAB OR LONG-TERM CARE | End: 2025-07-03
Attending: EMERGENCY MEDICINE | Admitting: FAMILY MEDICINE
Payer: MEDICARE

## 2025-06-28 DIAGNOSIS — J96.01 ACUTE HYPOXIC RESPIRATORY FAILURE: Primary | ICD-10-CM

## 2025-06-28 DIAGNOSIS — J18.9 PNEUMONIA OF BOTH LUNGS DUE TO INFECTIOUS ORGANISM, UNSPECIFIED PART OF LUNG: ICD-10-CM

## 2025-06-28 LAB
ALBUMIN SERPL-MCNC: 3.6 G/DL (ref 3.5–5.2)
ALBUMIN/GLOB SERPL: 1.1 G/DL
ALP SERPL-CCNC: 89 U/L (ref 39–117)
ALT SERPL W P-5'-P-CCNC: 15 U/L (ref 1–33)
ANION GAP SERPL CALCULATED.3IONS-SCNC: 10.4 MMOL/L (ref 5–15)
AST SERPL-CCNC: 36 U/L (ref 1–32)
B PARAPERT DNA SPEC QL NAA+PROBE: NOT DETECTED
B PERT DNA SPEC QL NAA+PROBE: NOT DETECTED
BACTERIA UR QL AUTO: NORMAL /HPF
BASOPHILS # BLD AUTO: 0.06 10*3/MM3 (ref 0–0.2)
BASOPHILS NFR BLD AUTO: 0.5 % (ref 0–1.5)
BILIRUB SERPL-MCNC: 0.7 MG/DL (ref 0–1.2)
BILIRUB UR QL STRIP: NEGATIVE
BUN SERPL-MCNC: 21 MG/DL (ref 8–23)
BUN/CREAT SERPL: 39.6 (ref 7–25)
C PNEUM DNA NPH QL NAA+NON-PROBE: NOT DETECTED
CALCIUM SPEC-SCNC: 8.8 MG/DL (ref 8.6–10.5)
CHLORIDE SERPL-SCNC: 98 MMOL/L (ref 98–107)
CLARITY UR: CLEAR
CO2 SERPL-SCNC: 26.6 MMOL/L (ref 22–29)
COLOR UR: YELLOW
CREAT SERPL-MCNC: 0.53 MG/DL (ref 0.57–1)
D-LACTATE SERPL-SCNC: 0.8 MMOL/L (ref 0.5–2)
DEPRECATED RDW RBC AUTO: 45 FL (ref 37–54)
EGFRCR SERPLBLD CKD-EPI 2021: 88.5 ML/MIN/1.73
EOSINOPHIL # BLD AUTO: 0.02 10*3/MM3 (ref 0–0.4)
EOSINOPHIL NFR BLD AUTO: 0.2 % (ref 0.3–6.2)
ERYTHROCYTE [DISTWIDTH] IN BLOOD BY AUTOMATED COUNT: 13.4 % (ref 12.3–15.4)
FLUAV SUBTYP SPEC NAA+PROBE: NOT DETECTED
FLUBV RNA NPH QL NAA+NON-PROBE: NOT DETECTED
GEN 5 1HR TROPONIN T REFLEX: 19 NG/L
GLOBULIN UR ELPH-MCNC: 3.2 GM/DL
GLUCOSE SERPL-MCNC: 103 MG/DL (ref 65–99)
GLUCOSE UR STRIP-MCNC: NEGATIVE MG/DL
HADV DNA SPEC NAA+PROBE: NOT DETECTED
HCOV 229E RNA SPEC QL NAA+PROBE: NOT DETECTED
HCOV HKU1 RNA SPEC QL NAA+PROBE: NOT DETECTED
HCOV NL63 RNA SPEC QL NAA+PROBE: NOT DETECTED
HCOV OC43 RNA SPEC QL NAA+PROBE: NOT DETECTED
HCT VFR BLD AUTO: 35 % (ref 34–46.6)
HGB BLD-MCNC: 11.9 G/DL (ref 12–15.9)
HGB UR QL STRIP.AUTO: ABNORMAL
HMPV RNA NPH QL NAA+NON-PROBE: NOT DETECTED
HOLD SPECIMEN: NORMAL
HOLD SPECIMEN: NORMAL
HPIV1 RNA ISLT QL NAA+PROBE: NOT DETECTED
HPIV2 RNA SPEC QL NAA+PROBE: NOT DETECTED
HPIV3 RNA NPH QL NAA+PROBE: NOT DETECTED
HPIV4 P GENE NPH QL NAA+PROBE: NOT DETECTED
HYALINE CASTS UR QL AUTO: NORMAL /LPF
IMM GRANULOCYTES # BLD AUTO: 0.05 10*3/MM3 (ref 0–0.05)
IMM GRANULOCYTES NFR BLD AUTO: 0.4 % (ref 0–0.5)
KETONES UR QL STRIP: ABNORMAL
LEUKOCYTE ESTERASE UR QL STRIP.AUTO: NEGATIVE
LYMPHOCYTES # BLD AUTO: 1.09 10*3/MM3 (ref 0.7–3.1)
LYMPHOCYTES NFR BLD AUTO: 9.2 % (ref 19.6–45.3)
M PNEUMO IGG SER IA-ACNC: NOT DETECTED
MAGNESIUM SERPL-MCNC: 2 MG/DL (ref 1.6–2.4)
MCH RBC QN AUTO: 30.8 PG (ref 26.6–33)
MCHC RBC AUTO-ENTMCNC: 34 G/DL (ref 31.5–35.7)
MCV RBC AUTO: 90.7 FL (ref 79–97)
MONOCYTES # BLD AUTO: 1.63 10*3/MM3 (ref 0.1–0.9)
MONOCYTES NFR BLD AUTO: 13.7 % (ref 5–12)
NEUTROPHILS NFR BLD AUTO: 76 % (ref 42.7–76)
NEUTROPHILS NFR BLD AUTO: 9.03 10*3/MM3 (ref 1.7–7)
NITRITE UR QL STRIP: NEGATIVE
NRBC BLD AUTO-RTO: 0 /100 WBC (ref 0–0.2)
NT-PROBNP SERPL-MCNC: 1505 PG/ML (ref 0–1800)
PH UR STRIP.AUTO: 6.5 [PH] (ref 5–8)
PLATELET # BLD AUTO: 311 10*3/MM3 (ref 140–450)
PMV BLD AUTO: 9.1 FL (ref 6–12)
POTASSIUM SERPL-SCNC: 3.9 MMOL/L (ref 3.5–5.2)
PROCALCITONIN SERPL-MCNC: 0.29 NG/ML (ref 0–0.25)
PROCALCITONIN SERPL-MCNC: 0.3 NG/ML (ref 0–0.25)
PROT SERPL-MCNC: 6.8 G/DL (ref 6–8.5)
PROT UR QL STRIP: ABNORMAL
RBC # BLD AUTO: 3.86 10*6/MM3 (ref 3.77–5.28)
RBC # UR STRIP: NORMAL /HPF
REF LAB TEST METHOD: NORMAL
RHINOVIRUS RNA SPEC NAA+PROBE: NOT DETECTED
RSV RNA NPH QL NAA+NON-PROBE: NOT DETECTED
SARS-COV-2 RNA RESP QL NAA+PROBE: NOT DETECTED
SODIUM SERPL-SCNC: 135 MMOL/L (ref 136–145)
SP GR UR STRIP: >1.03 (ref 1–1.03)
SQUAMOUS #/AREA URNS HPF: NORMAL /HPF
TROPONIN T % DELTA: -5
TROPONIN T NUMERIC DELTA: -1 NG/L
TROPONIN T SERPL HS-MCNC: 20 NG/L
UROBILINOGEN UR QL STRIP: ABNORMAL
WBC # UR STRIP: NORMAL /HPF
WBC NRBC COR # BLD AUTO: 11.88 10*3/MM3 (ref 3.4–10.8)
WHOLE BLOOD HOLD COAG: NORMAL
WHOLE BLOOD HOLD SPECIMEN: NORMAL

## 2025-06-28 PROCEDURE — 25010000002 CEFTRIAXONE PER 250 MG

## 2025-06-28 PROCEDURE — 80053 COMPREHEN METABOLIC PANEL: CPT | Performed by: STUDENT IN AN ORGANIZED HEALTH CARE EDUCATION/TRAINING PROGRAM

## 2025-06-28 PROCEDURE — 70450 CT HEAD/BRAIN W/O DYE: CPT

## 2025-06-28 PROCEDURE — 99291 CRITICAL CARE FIRST HOUR: CPT | Performed by: EMERGENCY MEDICINE

## 2025-06-28 PROCEDURE — 93005 ELECTROCARDIOGRAM TRACING: CPT | Performed by: STUDENT IN AN ORGANIZED HEALTH CARE EDUCATION/TRAINING PROGRAM

## 2025-06-28 PROCEDURE — 0202U NFCT DS 22 TRGT SARS-COV-2: CPT

## 2025-06-28 PROCEDURE — 25810000003 SODIUM CHLORIDE 0.9 % SOLUTION 250 ML FLEX CONT: Performed by: FAMILY MEDICINE

## 2025-06-28 PROCEDURE — 71275 CT ANGIOGRAPHY CHEST: CPT

## 2025-06-28 PROCEDURE — 81001 URINALYSIS AUTO W/SCOPE: CPT

## 2025-06-28 PROCEDURE — 71045 X-RAY EXAM CHEST 1 VIEW: CPT

## 2025-06-28 PROCEDURE — 25010000002 AMPICILLIN-SULBACTAM PER 1.5 G: Performed by: FAMILY MEDICINE

## 2025-06-28 PROCEDURE — 83605 ASSAY OF LACTIC ACID: CPT | Performed by: FAMILY MEDICINE

## 2025-06-28 PROCEDURE — 87040 BLOOD CULTURE FOR BACTERIA: CPT

## 2025-06-28 PROCEDURE — 94761 N-INVAS EAR/PLS OXIMETRY MLT: CPT

## 2025-06-28 PROCEDURE — 25010000002 METHYLPREDNISOLONE PER 125 MG

## 2025-06-28 PROCEDURE — 85025 COMPLETE CBC W/AUTO DIFF WBC: CPT | Performed by: STUDENT IN AN ORGANIZED HEALTH CARE EDUCATION/TRAINING PROGRAM

## 2025-06-28 PROCEDURE — 25010000002 DOXYCYCLINE 100 MG RECONSTITUTED SOLUTION 1 EACH VIAL

## 2025-06-28 PROCEDURE — 83880 ASSAY OF NATRIURETIC PEPTIDE: CPT

## 2025-06-28 PROCEDURE — 84145 PROCALCITONIN (PCT): CPT

## 2025-06-28 PROCEDURE — 84145 PROCALCITONIN (PCT): CPT | Performed by: FAMILY MEDICINE

## 2025-06-28 PROCEDURE — P9612 CATHETERIZE FOR URINE SPEC: HCPCS

## 2025-06-28 PROCEDURE — 36415 COLL VENOUS BLD VENIPUNCTURE: CPT

## 2025-06-28 PROCEDURE — 74177 CT ABD & PELVIS W/CONTRAST: CPT

## 2025-06-28 PROCEDURE — 99222 1ST HOSP IP/OBS MODERATE 55: CPT | Performed by: FAMILY MEDICINE

## 2025-06-28 PROCEDURE — 25510000001 IOPAMIDOL 61 % SOLUTION: Performed by: EMERGENCY MEDICINE

## 2025-06-28 PROCEDURE — 83735 ASSAY OF MAGNESIUM: CPT | Performed by: STUDENT IN AN ORGANIZED HEALTH CARE EDUCATION/TRAINING PROGRAM

## 2025-06-28 PROCEDURE — 94640 AIRWAY INHALATION TREATMENT: CPT

## 2025-06-28 PROCEDURE — 84484 ASSAY OF TROPONIN QUANT: CPT | Performed by: STUDENT IN AN ORGANIZED HEALTH CARE EDUCATION/TRAINING PROGRAM

## 2025-06-28 RX ORDER — IPRATROPIUM BROMIDE AND ALBUTEROL SULFATE 2.5; .5 MG/3ML; MG/3ML
3 SOLUTION RESPIRATORY (INHALATION) ONCE
Status: COMPLETED | OUTPATIENT
Start: 2025-06-28 | End: 2025-06-28

## 2025-06-28 RX ORDER — ACETAMINOPHEN 325 MG/1
650 TABLET ORAL EVERY 4 HOURS PRN
Status: DISCONTINUED | OUTPATIENT
Start: 2025-06-28 | End: 2025-07-03 | Stop reason: HOSPADM

## 2025-06-28 RX ORDER — SODIUM CHLORIDE 0.9 % (FLUSH) 0.9 %
10 SYRINGE (ML) INJECTION EVERY 12 HOURS SCHEDULED
Status: DISCONTINUED | OUTPATIENT
Start: 2025-06-28 | End: 2025-07-03 | Stop reason: HOSPADM

## 2025-06-28 RX ORDER — BISACODYL 10 MG
10 SUPPOSITORY, RECTAL RECTAL DAILY PRN
Status: DISCONTINUED | OUTPATIENT
Start: 2025-06-28 | End: 2025-07-03 | Stop reason: HOSPADM

## 2025-06-28 RX ORDER — ONDANSETRON 2 MG/ML
4 INJECTION INTRAMUSCULAR; INTRAVENOUS EVERY 6 HOURS PRN
Status: DISCONTINUED | OUTPATIENT
Start: 2025-06-28 | End: 2025-07-03 | Stop reason: HOSPADM

## 2025-06-28 RX ORDER — SODIUM CHLORIDE 9 MG/ML
40 INJECTION, SOLUTION INTRAVENOUS AS NEEDED
Status: DISCONTINUED | OUTPATIENT
Start: 2025-06-28 | End: 2025-07-03 | Stop reason: HOSPADM

## 2025-06-28 RX ORDER — BISACODYL 5 MG/1
5 TABLET, DELAYED RELEASE ORAL DAILY PRN
Status: DISCONTINUED | OUTPATIENT
Start: 2025-06-28 | End: 2025-07-03 | Stop reason: HOSPADM

## 2025-06-28 RX ORDER — AMOXICILLIN 250 MG
2 CAPSULE ORAL 2 TIMES DAILY PRN
Status: DISCONTINUED | OUTPATIENT
Start: 2025-06-28 | End: 2025-07-03 | Stop reason: HOSPADM

## 2025-06-28 RX ORDER — SODIUM CHLORIDE 0.9 % (FLUSH) 0.9 %
10 SYRINGE (ML) INJECTION AS NEEDED
Status: DISCONTINUED | OUTPATIENT
Start: 2025-06-28 | End: 2025-07-03 | Stop reason: HOSPADM

## 2025-06-28 RX ORDER — IOPAMIDOL 612 MG/ML
100 INJECTION, SOLUTION INTRAVASCULAR
Status: COMPLETED | OUTPATIENT
Start: 2025-06-28 | End: 2025-06-28

## 2025-06-28 RX ORDER — ACETAMINOPHEN 160 MG/5ML
650 SOLUTION ORAL EVERY 4 HOURS PRN
Status: DISCONTINUED | OUTPATIENT
Start: 2025-06-28 | End: 2025-07-03 | Stop reason: HOSPADM

## 2025-06-28 RX ORDER — POLYETHYLENE GLYCOL 3350 17 G/17G
17 POWDER, FOR SOLUTION ORAL DAILY PRN
Status: DISCONTINUED | OUTPATIENT
Start: 2025-06-28 | End: 2025-07-03 | Stop reason: HOSPADM

## 2025-06-28 RX ORDER — NITROGLYCERIN 0.4 MG/1
0.4 TABLET SUBLINGUAL
Status: DISCONTINUED | OUTPATIENT
Start: 2025-06-28 | End: 2025-07-03 | Stop reason: HOSPADM

## 2025-06-28 RX ORDER — METHYLPREDNISOLONE SODIUM SUCCINATE 125 MG/2ML
125 INJECTION, POWDER, LYOPHILIZED, FOR SOLUTION INTRAMUSCULAR; INTRAVENOUS ONCE
Status: COMPLETED | OUTPATIENT
Start: 2025-06-28 | End: 2025-06-28

## 2025-06-28 RX ORDER — ACETAMINOPHEN 650 MG/1
650 SUPPOSITORY RECTAL EVERY 4 HOURS PRN
Status: DISCONTINUED | OUTPATIENT
Start: 2025-06-28 | End: 2025-07-03 | Stop reason: HOSPADM

## 2025-06-28 RX ADMIN — IOPAMIDOL 75 ML: 612 INJECTION, SOLUTION INTRAVENOUS at 15:54

## 2025-06-28 RX ADMIN — SODIUM CHLORIDE 2000 MG: 9 INJECTION, SOLUTION INTRAVENOUS at 16:59

## 2025-06-28 RX ADMIN — SODIUM CHLORIDE 40 ML: 9 INJECTION, SOLUTION INTRAVENOUS at 21:05

## 2025-06-28 RX ADMIN — AMPICILLIN SODIUM AND SULBACTAM SODIUM 3 G: 2; 1 INJECTION, POWDER, FOR SOLUTION INTRAMUSCULAR; INTRAVENOUS at 21:04

## 2025-06-28 RX ADMIN — IPRATROPIUM BROMIDE AND ALBUTEROL SULFATE 3 ML: 2.5; .5 SOLUTION RESPIRATORY (INHALATION) at 15:12

## 2025-06-28 RX ADMIN — DOXYCYCLINE 100 MG: 100 INJECTION, POWDER, LYOPHILIZED, FOR SOLUTION INTRAVENOUS at 17:39

## 2025-06-28 RX ADMIN — BISACODYL 5 MG: 5 TABLET, COATED ORAL at 21:04

## 2025-06-28 RX ADMIN — METHYLPREDNISOLONE SODIUM SUCCINATE 125 MG: 125 INJECTION INTRAMUSCULAR; INTRAVENOUS at 15:30

## 2025-06-28 RX ADMIN — Medication 10 ML: at 21:04

## 2025-06-28 NOTE — H&P
[facility] HOSPITALIST   HISTORY AND PHYSICAL      Name:  Kristine Hahn   Age:  89 y.o.  Sex:  female  :  1935  MRN:  [id number]   Visit Number:  [id number]  Admission Date:  2025  Date Of Service:  25  Primary Care Physician:  Felton Choe MD    Chief Complaint:     Dyspnea    History Of Presenting Illness:      Patient is an 89-year-old female transferred by ambulance from nursing home due to concern for confusion and shortness of breath. Patient is requiring 3 L nasal cannula which represents a new oxygen requirement as she typically does not require O2.  Patient states she has been short of breath over the last 24 hours, has been progressively worsening.  Denies any chest pain or any other concerns.  Patient denies any recent sick contacts, travel history, or medication changes.    In the emergency department, Labs show mild elevations in white count and procalcitonin otherwise largely unremarkable. CT PE study shows left lower lobe and right middle lobe infiltrates concerning for pneumonia.  Patient was started patient on Rocephin and doxycycline.  Hospitalist services consulted for admission for acute hypoxic respiratory failure secondary to pneumonia.    Review Of Systems:    All systems were reviewed and negative except as mentioned in history of presenting illness, assessment and plan.    Past Medical History: Patient  has a past medical history of Anemia, Arthritis, Cancer, GERD (gastroesophageal reflux disease), and Glaucoma.    Past Surgical History: Patient  has no past surgical history on file.    Social History: Patient  reports that she has never smoked. She has never used smokeless tobacco. She reports that she does not drink alcohol and does not use drugs.    Family History:  Patient's family history has been reviewed and found to be noncontributory.     Allergies:      Prednisone    Home Medications:    Prior to Admission Medications       Prescriptions  Last Dose Informant Patient Reported? Taking?    acetaminophen (TYLENOL) 325 MG tablet   Yes No    Take 2 tablets by mouth Every 6 (Six) Hours As Needed for Mild Pain. Indications: Pain    albuterol (PROVENTIL) (2.5 MG/3ML) 0.083% nebulizer solution   No No    Take 2.5 mg by nebulization Every 4 (Four) Hours As Needed for Wheezing.    albuterol sulfate  (90 Base) MCG/ACT inhaler   No No    Inhale 2 puffs Every 4 (Four) Hours As Needed for Wheezing.    aspirin 81 MG chewable tablet   Yes No    Chew 1 tablet every day by oral route.    Diclofenac Sodium 3 % gel gel   No No    Apply 2 g topically to the appropriate area as directed 3 (Three) Times a Day.    famotidine (PEPCID) 20 MG tablet   No No    Take 1 tablet by mouth Daily.    iron polysacch complex-B12-FA (FERREX 150 FORTE) 150-0.025-1 MG capsule capsule   Yes No    Take  by mouth Daily. Indications: Anemia From Inadequate Iron in the Body    ketoconazole (NIZORAL) 2 % cream   No No    Apply  topically to the appropriate area as directed Daily.    Latanoprost 0.005 % emulsion   No No    Administer 1 drop to both eyes Daily.    loratadine (CLARITIN) 10 MG tablet   No No    Take 1 tablet by mouth Daily.    Multiple Vitamins-Minerals (PreserVision AREDS 2) chewable tablet   No No    Chew 1 each Daily With Breakfast & Dinner.    polyethylene glycol (MIRALAX) 17 g packet   Yes No    Take 17 g by mouth Daily. Indications: Constipation    polyvinyl alcohol (LIQUIFILM) 1.4 % ophthalmic solution   Yes No    1 drop As Needed for Dry Eyes.    simethicone (MYLICON) 80 MG chewable tablet   Yes No    Chew 1 tablet Every 6 (Six) Hours As Needed for Flatulence. Indications: Gas    timolol (Betimol) 0.5 % ophthalmic solution   No No    Administer 1 drop to both eyes Daily.    Umeclidinium-Vilanterol (Anoro Ellipta) 62.5-25 MCG/ACT aerosol powder  inhaler   No No    Inhale 1 puff Daily.    verapamil (CALAN) 80 MG tablet   No No    Take 1 tablet by mouth 3 (Three) Times a  "Day.    vitamin C (ASCORBIC ACID) 250 MG tablet   Yes No    Take 1 tablet by mouth Daily.    Vitamin D, Cholecalciferol, (CHOLECALCIFEROL) 10 MCG (400 UNIT) tablet   Yes No    Take 1 tablet by mouth Daily. Indications: Vitamin D Deficiency    Wound Dressings (Medihoney Ca Alginate 2\"x2\") pads   No No    Apply 1 patch topically Daily.          ED Medications:    Medications   sodium chloride 0.9 % flush 10 mL (has no administration in time range)   nitroglycerin (NITROSTAT) SL tablet 0.4 mg (has no administration in time range)   sodium chloride 0.9 % flush 10 mL (has no administration in time range)   sodium chloride 0.9 % flush 10 mL (has no administration in time range)   sodium chloride 0.9 % infusion 40 mL (has no administration in time range)   ondansetron (ZOFRAN) injection 4 mg (has no administration in time range)   Potassium Replacement - Follow Nurse / BPA Driven Protocol (has no administration in time range)   Magnesium Cardiology Dose Replacement - Follow Nurse / BPA Driven Protocol (has no administration in time range)   Phosphorus Replacement - Follow Nurse / BPA Driven Protocol (has no administration in time range)   Calcium Replacement - Follow Nurse / BPA Driven Protocol (has no administration in time range)   ampicillin-sulbactam (UNASYN) 3 g in sodium chloride 0.9 % 100 mL IVPB-VTB (has no administration in time range)   doxycycline (VIBRAMYCIN) 100 mg in sodium chloride 0.9 % 100 mL IVPB-VTB (has no administration in time range)   sennosides-docusate (PERICOLACE) 8.6-50 MG per tablet 2 tablet (has no administration in time range)     And   polyethylene glycol (MIRALAX) packet 17 g (has no administration in time range)     And   bisacodyl (DULCOLAX) EC tablet 5 mg (has no administration in time range)     And   bisacodyl (DULCOLAX) suppository 10 mg (has no administration in time range)   acetaminophen (TYLENOL) tablet 650 mg (has no administration in time range)     Or   acetaminophen (TYLENOL) " "160 MG/5ML oral solution 650 mg (has no administration in time range)     Or   acetaminophen (TYLENOL) suppository 650 mg (has no administration in time range)   methylPREDNISolone sodium succinate (SOLU-Medrol) injection 125 mg (125 mg Intravenous Given 6/28/25 1530)   ipratropium-albuterol (DUO-NEB) nebulizer solution 3 mL (3 mL Nebulization Given 6/28/25 1512)   iopamidol (ISOVUE-300) 61 % injection 100 mL (75 mL Intravenous Given 6/28/25 1554)   cefTRIAXone (ROCEPHIN) 2,000 mg in sodium chloride 0.9 % 100 mL IVPB-VTB (0 mg Intravenous Stopped 6/28/25 1739)   doxycycline (VIBRAMYCIN) 100 mg in sodium chloride 0.9 % 100 mL IVPB-VTB (100 mg Intravenous New Bag 6/28/25 1739)     Vital Signs:  Temp:  [99.4 °F (37.4 °C)] 99.4 °F (37.4 °C)  Heart Rate:  [75-94] 75  Resp:  [18] 18  BP: (137-164)/(71-92) 139/71        06/28/25  1446   Weight: 43.6 kg (96 lb 1.9 oz)     Body mass index is 17.58 kg/m².    Physical Exam:     Most recent vital Signs: /71   Pulse 75   Temp 99.4 °F (37.4 °C) (Oral)   Resp 18   Ht 157.5 cm (62\")   Wt 43.6 kg (96 lb 1.9 oz)   SpO2 95%   BMI 17.58 kg/m²     Physical Exam  Constitutional: Awake, alert  Eyes: PERRLA, sclerae anicteric, no conjunctival injection  HENT: NCAT, mucous membranes moist  Neck: Supple, no thyromegaly, no lymphadenopathy, trachea midline  Respiratory: Scattered wheezing and rhonchi, worse on the left than right.  Nonlabored respirations   Cardiovascular: RRR, no murmurs, rubs, or gallops, palpable pedal pulses bilaterally  Gastrointestinal: Positive bowel sounds, soft, nontender, nondistended  Musculoskeletal: No bilateral ankle edema, no clubbing or cyanosis to extremities  Psychiatric: Appropriate affect, cooperative  Neurologic: Oriented to person and place, strength symmetric in all extremities, Cranial Nerves grossly intact to confrontation, speech clear  Skin: No rashes     Laboratory data:    I have reviewed the labs done in the emergency " room.    Results from last 7 days   Lab Units 06/28/25  1452   SODIUM mmol/L 135*   POTASSIUM mmol/L 3.9   CHLORIDE mmol/L 98   CO2 mmol/L 26.6   BUN mg/dL 21.0   CREATININE mg/dL 0.53*   CALCIUM mg/dL 8.8   BILIRUBIN mg/dL 0.7   ALK PHOS U/L 89   ALT (SGPT) U/L 15   AST (SGOT) U/L 36*   GLUCOSE mg/dL 103*     Results from last 7 days   Lab Units 06/28/25  1452   WBC 10*3/mm3 11.88*   HEMOGLOBIN g/dL 11.9*   HEMATOCRIT % 35.0   PLATELETS 10*3/mm3 311         Results from last 7 days   Lab Units 06/28/25  1625 06/28/25  1452   HSTROP T ng/L 19* 20*     Results from last 7 days   Lab Units 06/28/25  1452   PROBNP pg/mL 1,505.0     Results from last 7 days   Lab Units 06/28/25  1643   COLOR UA  Yellow   GLUCOSE UA  Negative   KETONES UA  15 mg/dL (1+)*   BLOOD UA  Small (1+)*   LEUKOCYTES UA  Negative   PH, URINE  6.5   BILIRUBIN UA  Negative   UROBILINOGEN UA  1.0 E.U./dL   RBC UA /HPF 0-2   WBC UA /HPF 0-2     Radiology:    CT Abdomen Pelvis With Contrast  Result Date: 6/28/2025  FINAL REPORT TECHNIQUE: null CLINICAL HISTORY: RUQ/RLQ pain COMPARISON: null FINDINGS: CT abdomen and pelvis with contrast Comparison: None provided Findings: Findings at the lung bases are reported separately. Unremarkable gallbladder and solid organs. No urolithiasis. No bowel obstruction, pneumoperitoneum, or pneumatosis. Small bilateral femoral hernias containing fat. Poorly distended urinary bladder. Otherwise unremarkable pelvic contents. The appendix is not definitively seen. There are no secondary findings to suggest appendicitis. The bones are intact. Chronic deformity of the left femoral head. Advanced arthritic changes of the left hip.     IMPRESSION: No acute abdominal disease. Authenticated and Electronically Signed by Lili Hughes MD on 06/28/2025 06:10:48 PM    CT Angiogram Chest Pulmonary Embolism  Result Date: 6/28/2025  FINAL REPORT TECHNIQUE: null CLINICAL HISTORY: Pulmonary Embolism COMPARISON: null FINDINGS: CT  angiography chest with contrast. 3D Postprocessing. Comparison: None provided Findings: The heart is mildly enlarged. The thoracic aorta is normal caliber. No pulmonary artery filling defects. 1.3 cm nodule within the left lobe of the thyroid gland. Multiple mildly enlarged lymph nodes within the mediastinum. Lymph nodes measure up to 12 mm in short axis dimension. There is mild generalized tracheal narrowing. There are small bilateral pleural effusions, left much larger than right. Adjacent compressive atelectasis within the left lower lobe. Small focus of consolidation and adjacent ground-glass opacity within the medial aspect of the left upper lobe. Consolidation and volume loss involving a portion of the right middle lobe. Bandlike atelectasis within the posterior aspect of the right upper lobe. Bronchiolar inflammation and nodularity within the left lower lobe. The visualized upper abdomen is unremarkable. No acute fractures.     IMPRESSION: 1. No evidence of pulmonary artery embolism. 2. There are foci of infiltrate within the left upper and lower lobes. Consolidation and volume loss is also present involving a portion of the right middle lobe. This may be secondary to atelectasis and/or infiltrates. 3. There is a mild degree of mediastinal lymphadenopathy. 4. Small bilateral pleural effusions, left larger than right. Authenticated and Electronically Signed by Lili Hughes MD on 06/28/2025 06:08:20 PM    CT Head Without Contrast  Result Date: 6/28/2025  HEAD CT     6/28/2025 3:38 PM  HISTORY: AMS, hypoxia Altered mental status.  TECHNIQUE: Multiple axial CT images were performed from the foramen magnum to the vertex. Coronal reformatted images were reconstructed from axial data set. This study was performed with techniques to keep radiation doses as low as reasonably achievable (ALARA). Individualized dose reduction techniques using automated exposure control or adjustment of mA and/or kV according to the  patient size were employed. 526.08 mGy.cm  COMPARISON: None  FINDINGS: Motion artifact. No acute intracranial hemorrhage or large acute cortical infarct. Chronic small vessel ischemic white matter changes and generalized cerebral volume loss are present. Ventricles are enlarged including bilateral lateral ventricles, third and fourth ventricle.. No midline shift. The basal cisterns are patent. Intracranial arterial atherosclerotic calcifications. Small calcified extra-axial mass along the right frontal convexity, measuring 10 mm, likely presenting a meningioma. Bilateral globe lens surgery.  No skull fracture. Chronic bilateral maxillary sinus mucoperiosteal thickening. Moderate opacification of the right anterior and posterior ethmoidal air cells. The renal visualized paranasal sinuses and mastoid air cells are clear.      No acute intracranial abnormality. Chronic microvascular ischemic white matter changes with global volume loss and moderate ventriculomegaly. Other findings as above   CRITICAL RESULT: No.  COMMUNICATION: Per this written report.  Images personally reviewed, interpreted, and dictated by JOSS Whipple.            This report was signed and finalized on 6/28/2025 3:59 PM by JOSS Whipple.      XR Chest 1 View  Result Date: 6/28/2025  CLINICAL INDICATION:  Weak/Dizzy/AMS triage protocol  EXAMINATION TECHNIQUE: XR CHEST 1 VW-  COMPARISON: None.  FINDINGS: Mild cardiomegaly. Aortic atherosclerosis and tortuosity. Perihilar vascular engorgement. No overt edema. No pneumothorax. No large pleural effusion. Right midlung and medial left lower lung zone patchy opacities. Moderate degenerative changes of the spine. Moderate degenerative changes of the bilateral shoulder joints. No free air under the hemidiaphragms.      Mild cardiomegaly. Right midlung and medial left lower lung zone patchy opacities, atelectasis or pneumonia.  Images personally reviewed, interpreted and dictated by Jett  JOSS Smith.     This report was signed and finalized on 6/28/2025 3:14 PM by JOSS Whipple.        Assessment:    Acute hypoxic respiratory failure  Community-acquired pneumonia  Acute exacerbation of COPD  Iron deficiency anemia  GERD    Plan:    Acute hypoxic respiratory failure  Community-acquired pneumonia  Acute exacerbation of COPD  Patient has new oxygen requirements, 6 L nasal cannula.  Evidence of pneumonia on CT scan.  IV Unasyn, IV doxycycline.  DuoNebs.  Mucinex  Iron deficiency anemia  GERD  Resume home medications as appropriate.    Risk Assessment: High  DVT Prophylaxis: SCDs  Code Status: Full code  Diet: Consistent carb, cardiac    Mike Jang,   06/28/25  18:43 EDT    Dictated utilizing Dragon dictation.

## 2025-06-28 NOTE — ED PROVIDER NOTES
EMERGENCY DEPARTMENT ENCOUNTER    Pt Name: Kristine Hahn  MRN: [id number]  Pt :   1935  Room Number:  1616  Date of encounter:  2025  PCP: Felton Choe MD  ED Provider: Henry Lowe PA-C    Historian: Patient, nursing notes, nursing home notes      HPI:  Chief Complaint: Shortness of breath, AMS        Context: Kristine Hahn is a 89 y.o. female who presents to the ED via EMS from nursing home for evaluation of confusion and shortness of breath.  Patient is mildly confused at time of my exam.  She does verbalize shortness of breath but denies chest pain.  She reported bilateral upper abdominal tenderness to palpation.  Patient denies any other medical complaints at this time.      PAST MEDICAL HISTORY  Past Medical History:   Diagnosis Date    Anemia     Arthritis     Cancer     GERD (gastroesophageal reflux disease)     Glaucoma          PAST SURGICAL HISTORY  History reviewed. No pertinent surgical history.      FAMILY HISTORY  Family History   Problem Relation Age of Onset    Arthritis Mother     Arthritis Maternal Grandmother          SOCIAL HISTORY  Social History     Socioeconomic History    Marital status: Single   Tobacco Use    Smoking status: Never    Smokeless tobacco: Never   Vaping Use    Vaping status: Never Used   Substance and Sexual Activity    Alcohol use: Never    Drug use: Never    Sexual activity: Defer         ALLERGIES  Prednisone        REVIEW OF SYSTEMS  Review of Systems   Constitutional:  Negative for chills and fever.   HENT:  Negative for congestion and sore throat.    Respiratory:  Positive for shortness of breath. Negative for cough.    Cardiovascular:  Negative for chest pain.   Gastrointestinal:  Positive for abdominal pain. Negative for nausea and vomiting.   Genitourinary:  Negative for dysuria.   Musculoskeletal:  Negative for back pain.   Skin:  Negative for wound.   Neurological:  Negative for dizziness and headaches.   Psychiatric/Behavioral:   Negative for confusion.    All other systems reviewed and are negative.         All systems reviewed and negative except for those discussed in HPI.       PHYSICAL EXAM    I have reviewed the triage vital signs and nursing notes.    ED Triage Vitals [06/28/25 1446]   Temp Heart Rate Resp BP SpO2   99.4 °F (37.4 °C) 86 18 148/76 95 %      Temp src Heart Rate Source Patient Position BP Location FiO2 (%)   Oral Monitor Lying Left arm --       Physical Exam  Vitals and nursing note reviewed.   Constitutional:       General: She is not in acute distress.     Appearance: She is not ill-appearing, toxic-appearing or diaphoretic.   HENT:      Head: Normocephalic and atraumatic.      Mouth/Throat:      Mouth: Mucous membranes are moist.      Pharynx: Oropharynx is clear.   Eyes:      Extraocular Movements: Extraocular movements intact.   Cardiovascular:      Rate and Rhythm: Normal rate.      Heart sounds: Normal heart sounds.   Pulmonary:      Effort: Pulmonary effort is normal. No tachypnea or respiratory distress.      Breath sounds: Examination of the right-middle field reveals decreased breath sounds. Examination of the left-middle field reveals decreased breath sounds. Examination of the left-lower field reveals decreased breath sounds. Decreased breath sounds and rales present. No wheezing.   Abdominal:      General: There is no distension.      Tenderness: There is abdominal tenderness. There is no guarding.   Skin:     General: Skin is warm and dry.      Findings: No rash.   Neurological:      Mental Status: She is alert.             LAB RESULTS  Recent Results (from the past 24 hours)   Comprehensive Metabolic Panel    Collection Time: 06/28/25  2:52 PM    Specimen: Blood   Result Value Ref Range    Glucose 103 (H) 65 - 99 mg/dL    BUN 21.0 8.0 - 23.0 mg/dL    Creatinine 0.53 (L) 0.57 - 1.00 mg/dL    Sodium 135 (L) 136 - 145 mmol/L    Potassium 3.9 3.5 - 5.2 mmol/L    Chloride 98 98 - 107 mmol/L    CO2 26.6 22.0 -  29.0 mmol/L    Calcium 8.8 8.6 - 10.5 mg/dL    Total Protein 6.8 6.0 - 8.5 g/dL    Albumin 3.6 3.5 - 5.2 g/dL    ALT (SGPT) 15 1 - 33 U/L    AST (SGOT) 36 (H) 1 - 32 U/L    Alkaline Phosphatase 89 39 - 117 U/L    Total Bilirubin 0.7 0.0 - 1.2 mg/dL    Globulin 3.2 gm/dL    A/G Ratio 1.1 g/dL    BUN/Creatinine Ratio 39.6 (H) 7.0 - 25.0    Anion Gap 10.4 5.0 - 15.0 mmol/L    eGFR 88.5 >60.0 mL/min/1.73   High Sensitivity Troponin T    Collection Time: 06/28/25  2:52 PM    Specimen: Blood   Result Value Ref Range    HS Troponin T 20 (H) <14 ng/L   Magnesium    Collection Time: 06/28/25  2:52 PM    Specimen: Blood   Result Value Ref Range    Magnesium 2.0 1.6 - 2.4 mg/dL   Green Top (Gel)    Collection Time: 06/28/25  2:52 PM   Result Value Ref Range    Extra Tube Hold for add-ons.    Lavender Top    Collection Time: 06/28/25  2:52 PM   Result Value Ref Range    Extra Tube hold for add-on    Gold Top - SST    Collection Time: 06/28/25  2:52 PM   Result Value Ref Range    Extra Tube Hold for add-ons.    Light Blue Top    Collection Time: 06/28/25  2:52 PM   Result Value Ref Range    Extra Tube Hold for add-ons.    CBC Auto Differential    Collection Time: 06/28/25  2:52 PM    Specimen: Blood   Result Value Ref Range    WBC 11.88 (H) 3.40 - 10.80 10*3/mm3    RBC 3.86 3.77 - 5.28 10*6/mm3    Hemoglobin 11.9 (L) 12.0 - 15.9 g/dL    Hematocrit 35.0 34.0 - 46.6 %    MCV 90.7 79.0 - 97.0 fL    MCH 30.8 26.6 - 33.0 pg    MCHC 34.0 31.5 - 35.7 g/dL    RDW 13.4 12.3 - 15.4 %    RDW-SD 45.0 37.0 - 54.0 fl    MPV 9.1 6.0 - 12.0 fL    Platelets 311 140 - 450 10*3/mm3    Neutrophil % 76.0 42.7 - 76.0 %    Lymphocyte % 9.2 (L) 19.6 - 45.3 %    Monocyte % 13.7 (H) 5.0 - 12.0 %    Eosinophil % 0.2 (L) 0.3 - 6.2 %    Basophil % 0.5 0.0 - 1.5 %    Immature Grans % 0.4 0.0 - 0.5 %    Neutrophils, Absolute 9.03 (H) 1.70 - 7.00 10*3/mm3    Lymphocytes, Absolute 1.09 0.70 - 3.10 10*3/mm3    Monocytes, Absolute 1.63 (H) 0.10 - 0.90 10*3/mm3     Eosinophils, Absolute 0.02 0.00 - 0.40 10*3/mm3    Basophils, Absolute 0.06 0.00 - 0.20 10*3/mm3    Immature Grans, Absolute 0.05 0.00 - 0.05 10*3/mm3    nRBC 0.0 0.0 - 0.2 /100 WBC   Procalcitonin    Collection Time: 06/28/25  2:52 PM    Specimen: Blood   Result Value Ref Range    Procalcitonin 0.29 (H) 0.00 - 0.25 ng/mL   BNP    Collection Time: 06/28/25  2:52 PM    Specimen: Blood   Result Value Ref Range    proBNP 1,505.0 0.0 - 1,800.0 pg/mL   Respiratory Panel PCR w/COVID-19(SARS-CoV-2) MEGAN/KAREN/DEVEN/PAD/COR/BRENDA In-House, NP Swab in [place], 2 HR TAT - Swab, Nasopharynx    Collection Time: 06/28/25  3:15 PM    Specimen: Nasopharynx; Swab   Result Value Ref Range    ADENOVIRUS, PCR Not Detected Not Detected    Coronavirus 229E Not Detected Not Detected    Coronavirus HKU1 Not Detected Not Detected    Coronavirus NL63 Not Detected Not Detected    Coronavirus OC43 Not Detected Not Detected    COVID19 Not Detected Not Detected - Ref. Range    Human Metapneumovirus Not Detected Not Detected    Human Rhinovirus/Enterovirus Not Detected Not Detected    Influenza A PCR Not Detected Not Detected    Influenza B PCR Not Detected Not Detected    Parainfluenza Virus 1 Not Detected Not Detected    Parainfluenza Virus 2 Not Detected Not Detected    Parainfluenza Virus 3 Not Detected Not Detected    Parainfluenza Virus 4 Not Detected Not Detected    RSV, PCR Not Detected Not Detected    Bordetella pertussis pcr Not Detected Not Detected    Bordetella parapertussis PCR Not Detected Not Detected    Chlamydophila pneumoniae PCR Not Detected Not Detected    Mycoplasma pneumo by PCR Not Detected Not Detected   High Sensitivity Troponin T 1Hr    Collection Time: 06/28/25  4:25 PM    Specimen: Arm, Right; Blood   Result Value Ref Range    HS Troponin T 19 (H) <14 ng/L    Troponin T Numeric Delta -1 ng/L    Troponin T % Delta -5 Abnormal if >/= 20%   Urinalysis With Culture If Indicated - Urine, Catheter    Collection Time: 06/28/25   4:43 PM    Specimen: Urine, Catheter   Result Value Ref Range    Color, UA Yellow Yellow, Straw    Appearance, UA Clear Clear    pH, UA 6.5 5.0 - 8.0    Specific Gravity, UA >1.030 (H) 1.005 - 1.030    Glucose, UA Negative Negative    Ketones, UA 15 mg/dL (1+) (A) Negative    Bilirubin, UA Negative Negative    Blood, UA Small (1+) (A) Negative    Protein, UA 30 mg/dL (1+) (A) Negative    Leuk Esterase, UA Negative Negative    Nitrite, UA Negative Negative    Urobilinogen, UA 1.0 E.U./dL 0.2 - 1.0 E.U./dL   Urinalysis, Microscopic Only - Urine, Catheter    Collection Time: 06/28/25  4:43 PM    Specimen: Urine, Catheter   Result Value Ref Range    RBC, UA 0-2 None Seen, 0-2 /HPF    WBC, UA 0-2 None Seen, 0-2 /HPF    Bacteria, UA None Seen None Seen /HPF    Squamous Epithelial Cells, UA None Seen None Seen, 0-2 /HPF    Hyaline Casts, UA None Seen None Seen /LPF    Methodology Manual Light Microscopy        If labs were ordered, I independently reviewed the results and considered them in treating the patient.        RADIOLOGY  CT Abdomen Pelvis With Contrast  Result Date: 6/28/2025  FINAL REPORT TECHNIQUE: null CLINICAL HISTORY: RUQ/RLQ pain COMPARISON: null FINDINGS: CT abdomen and pelvis with contrast Comparison: None provided Findings: Findings at the lung bases are reported separately. Unremarkable gallbladder and solid organs. No urolithiasis. No bowel obstruction, pneumoperitoneum, or pneumatosis. Small bilateral femoral hernias containing fat. Poorly distended urinary bladder. Otherwise unremarkable pelvic contents. The appendix is not definitively seen. There are no secondary findings to suggest appendicitis. The bones are intact. Chronic deformity of the left femoral head. Advanced arthritic changes of the left hip.     IMPRESSION: No acute abdominal disease. Authenticated and Electronically Signed by Lili Hughse MD on 06/28/2025 06:10:48 PM    CT Angiogram Chest Pulmonary Embolism  Result Date:  6/28/2025  FINAL REPORT TECHNIQUE: null CLINICAL HISTORY: Pulmonary Embolism COMPARISON: null FINDINGS: CT angiography chest with contrast. 3D Postprocessing. Comparison: None provided Findings: The heart is mildly enlarged. The thoracic aorta is normal caliber. No pulmonary artery filling defects. 1.3 cm nodule within the left lobe of the thyroid gland. Multiple mildly enlarged lymph nodes within the mediastinum. Lymph nodes measure up to 12 mm in short axis dimension. There is mild generalized tracheal narrowing. There are small bilateral pleural effusions, left much larger than right. Adjacent compressive atelectasis within the left lower lobe. Small focus of consolidation and adjacent ground-glass opacity within the medial aspect of the left upper lobe. Consolidation and volume loss involving a portion of the right middle lobe. Bandlike atelectasis within the posterior aspect of the right upper lobe. Bronchiolar inflammation and nodularity within the left lower lobe. The visualized upper abdomen is unremarkable. No acute fractures.     IMPRESSION: 1. No evidence of pulmonary artery embolism. 2. There are foci of infiltrate within the left upper and lower lobes. Consolidation and volume loss is also present involving a portion of the right middle lobe. This may be secondary to atelectasis and/or infiltrates. 3. There is a mild degree of mediastinal lymphadenopathy. 4. Small bilateral pleural effusions, left larger than right. Authenticated and Electronically Signed by Lili Hughes MD on 06/28/2025 06:08:20 PM    CT Head Without Contrast  Result Date: 6/28/2025  HEAD CT     6/28/2025 3:38 PM  HISTORY: AMS, hypoxia Altered mental status.  TECHNIQUE: Multiple axial CT images were performed from the foramen magnum to the vertex. Coronal reformatted images were reconstructed from axial data set. This study was performed with techniques to keep radiation doses as low as reasonably achievable (ALARA). Individualized  dose reduction techniques using automated exposure control or adjustment of mA and/or kV according to the patient size were employed. 526.08 mGy.cm  COMPARISON: None  FINDINGS: Motion artifact. No acute intracranial hemorrhage or large acute cortical infarct. Chronic small vessel ischemic white matter changes and generalized cerebral volume loss are present. Ventricles are enlarged including bilateral lateral ventricles, third and fourth ventricle.. No midline shift. The basal cisterns are patent. Intracranial arterial atherosclerotic calcifications. Small calcified extra-axial mass along the right frontal convexity, measuring 10 mm, likely presenting a meningioma. Bilateral globe lens surgery.  No skull fracture. Chronic bilateral maxillary sinus mucoperiosteal thickening. Moderate opacification of the right anterior and posterior ethmoidal air cells. The renal visualized paranasal sinuses and mastoid air cells are clear.      No acute intracranial abnormality. Chronic microvascular ischemic white matter changes with global volume loss and moderate ventriculomegaly. Other findings as above   CRITICAL RESULT: No.  COMMUNICATION: Per this written report.  Images personally reviewed, interpreted, and dictated by JOSS Whipple.            This report was signed and finalized on 6/28/2025 3:59 PM by JOSS Whipple.      XR Chest 1 View  Result Date: 6/28/2025  CLINICAL INDICATION:  Weak/Dizzy/AMS triage protocol  EXAMINATION TECHNIQUE: XR CHEST 1 VW-  COMPARISON: None.  FINDINGS: Mild cardiomegaly. Aortic atherosclerosis and tortuosity. Perihilar vascular engorgement. No overt edema. No pneumothorax. No large pleural effusion. Right midlung and medial left lower lung zone patchy opacities. Moderate degenerative changes of the spine. Moderate degenerative changes of the bilateral shoulder joints. No free air under the hemidiaphragms.      Mild cardiomegaly. Right midlung and medial left lower lung zone  patchy opacities, atelectasis or pneumonia.  Images personally reviewed, interpreted and dictated by JOSS Whipple.     This report was signed and finalized on 6/28/2025 3:14 PM by JOSS Whipple.        I ordered and independently reviewed the above noted radiographic studies.      I viewed images of chest x-ray which showed right lower lobe and left lower lobe infiltrate per my independent interpretation.    I viewed images of CT PE study which showed right middle lobe and left lower lobe infiltrate, no PE per my independent interpretation    I viewed images of CT abdomen pelvis which was unremarkable per my independent interpretation      I viewed images of CT head scan which showed no acute intracranial abnormalities per my independent interpretation    See radiologist's dictation for official interpretation.        PROCEDURES    Critical Care    Performed by: Henry Lowe PA-C  Authorized by: Mike Jang DO    Critical care provider statement:     Critical care time (minutes):  42    Critical care time was exclusive of:  Separately billable procedures and treating other patients and teaching time    Critical care was necessary to treat or prevent imminent or life-threatening deterioration of the following conditions:  Respiratory failure    Critical care was time spent personally by me on the following activities:  Interpretation of cardiac output measurements, obtaining history from patient or surrogate, examination of patient, evaluation of patient's response to treatment, discussions with consultants, development of treatment plan with patient or surrogate, blood draw for specimens, ordering and performing treatments and interventions, ordering and review of laboratory studies, ordering and review of radiographic studies, pulse oximetry, re-evaluation of patient's condition and review of old charts    Care discussed with: admitting provider        ECG 12 Lead Altered Mental Status    Final Result          MEDICATIONS GIVEN IN ER    Medications   sodium chloride 0.9 % flush 10 mL (has no administration in time range)   nitroglycerin (NITROSTAT) SL tablet 0.4 mg (has no administration in time range)   sodium chloride 0.9 % flush 10 mL (has no administration in time range)   sodium chloride 0.9 % flush 10 mL (has no administration in time range)   sodium chloride 0.9 % infusion 40 mL (has no administration in time range)   ondansetron (ZOFRAN) injection 4 mg (has no administration in time range)   Potassium Replacement - Follow Nurse / BPA Driven Protocol (has no administration in time range)   Magnesium Cardiology Dose Replacement - Follow Nurse / BPA Driven Protocol (has no administration in time range)   Phosphorus Replacement - Follow Nurse / BPA Driven Protocol (has no administration in time range)   Calcium Replacement - Follow Nurse / BPA Driven Protocol (has no administration in time range)   ampicillin-sulbactam (UNASYN) 3 g in sodium chloride 0.9 % 100 mL IVPB-VTB (has no administration in time range)   doxycycline (VIBRAMYCIN) 100 mg in sodium chloride 0.9 % 100 mL IVPB-VTB (has no administration in time range)   sennosides-docusate (PERICOLACE) 8.6-50 MG per tablet 2 tablet (has no administration in time range)     And   polyethylene glycol (MIRALAX) packet 17 g (has no administration in time range)     And   bisacodyl (DULCOLAX) EC tablet 5 mg (has no administration in time range)     And   bisacodyl (DULCOLAX) suppository 10 mg (has no administration in time range)   acetaminophen (TYLENOL) tablet 650 mg (has no administration in time range)     Or   acetaminophen (TYLENOL) 160 MG/5ML oral solution 650 mg (has no administration in time range)     Or   acetaminophen (TYLENOL) suppository 650 mg (has no administration in time range)   methylPREDNISolone sodium succinate (SOLU-Medrol) injection 125 mg (125 mg Intravenous Given 6/28/25 1530)   ipratropium-albuterol (DUO-NEB) nebulizer  solution 3 mL (3 mL Nebulization Given 6/28/25 1512)   iopamidol (ISOVUE-300) 61 % injection 100 mL (75 mL Intravenous Given 6/28/25 2684)   cefTRIAXone (ROCEPHIN) 2,000 mg in sodium chloride 0.9 % 100 mL IVPB-VTB (0 mg Intravenous Stopped 6/28/25 1739)   doxycycline (VIBRAMYCIN) 100 mg in sodium chloride 0.9 % 100 mL IVPB-VTB (100 mg Intravenous New Bag 6/28/25 1739)         MEDICAL DECISION MAKING, PROGRESS, and CONSULTS    All labs, if obtained, have been independently reviewed by me.  All radiology studies, if obtained, have been reviewed by me and the radiologist dictating the report.  All EKG's, if obtained, have been independently viewed and interpreted by me/my attending physician.      Discussion below represents my analysis of pertinent findings related to patient's condition, differential diagnosis, treatment plan and final disposition.    Patient is an 89-year-old female presenting to ER via EMS for evaluation of altered mental status and dyspnea.  On exam the patient is seemingly pleasantly confused, her vital signs and pulse oximetry are all stable and within normal limits but patient is requiring 3 L nasal cannula which represents a new oxygen requirement.  She does have decreased breath sounds bilaterally.  DuoNeb and Solu-Medrol were administered.  Chest x-ray was obtained concerning for infiltrate of the right middle and left lower lobe.  Given concern for acute hypoxia and the patient having some generalized upper abdominal tenderness on palpation, CT chest PE study and a CT abdomen pelvis scan were obtained.      Lab work today revealed mild leukocytosis and procalcitonin elevation.  CT abdomen and pelvis was unremarkable but CT PE study confirmed the presence of right middle and left lobe pneumonia.  I therefore started patient on Ceftriaxone/Doxycycline and consulted with hospital medicine Dr. Jang who agreed to admit the patient to [facility] for further management of  acute hypoxic respiratory failure secondary to pneumonia.                         Differential diagnosis:    Differential diagnosis included but was not limited to altered mental status, UTI, confusion, dementia, pneumonia, COPD exacerbation, acute heart failure, ACS, arrhythmia      Additional sources:    - Discussed/ obtained information from independent historians: None    - External (non-ED) record review: Previous medical records reviewed    - Chronic or social conditions impacting care: None    Orders placed during this visit:  Orders Placed This Encounter   Procedures    Critical Care    Respiratory Panel PCR w/COVID-19(SARS-CoV-2) MEGAN/KAREN/DEVEN/PAD/COR/BRENDA In-House, NP Swab in UTM/VTM, 2 HR TAT - Swab, Nasopharynx    Blood Culture - Blood,    Blood Culture - Blood,    XR Chest 1 View    CT Angiogram Chest Pulmonary Embolism    CT Abdomen Pelvis With Contrast    CT Head Without Contrast    [place] Draw    Comprehensive Metabolic Panel    High Sensitivity Troponin T    Magnesium    CBC Auto Differential    Procalcitonin    BNP    Urinalysis With Culture If Indicated - Urine, Catheter    High Sensitivity Troponin T 1Hr    Urinalysis, Microscopic Only - Urine, Clean Catch    Basic Metabolic Panel    CBC Auto Differential    Blood Gas, Arterial -With Co-Ox Panel: Yes    Lactic Acid, Plasma    Procalcitonin    Diet: Cardiac, Diabetic; Healthy Heart (2-3 Na+); Consistent Carbohydrate; Fluid Consistency: Thin (IDDSI 0)    Undress & Gown    Vital Signs    Orthostatic Blood Pressure    Monitor Blood Pressure    Straight cath    Vital Signs    Telemetry - Place Orders & Notify Provider of Results When Patient Experiences Acute Chest Pain, Dysrhythmia or Respiratory Distress    May Be Off Telemetry for Tests    Intake & Output    Weigh Patient    Saline Lock & Maintain IV Access    Nursing Dysphagia Screening (Complete Prior to Giving Anything By Mouth)    RN to Place Order SLP Consult - Eval & Treat Choosing Reason of RN  Dysphagia Screen Failed    Nurse to Call MD or Nutrition Services for Diet if Patient Passes Dysphagia Screen    Place Sequential Compression Device    Maintain Sequential Compression Device    Continuous Pulse Oximetry    Up With Assistance    Daily Weights    Strict Intake & Output    Notify Provider if Patient Requires Greater Than 4 LPM of Oxygen    Pneumonia Education    Tobacco Cessation Education    Oral Care    Code Status and Medical Interventions: CPR (Attempt to Resuscitate); Full Support    Oxygen Therapy- Nasal Cannula; Titrate 1-6 LPM Per SpO2; 90 - 95%    POC Glucose Once    ECG 12 Lead Altered Mental Status    Insert Peripheral IV    Insert Peripheral IV    Inpatient Admission    Fall Precautions    Fall Precautions    CBC & Differential    Green Top (Gel)    Lavender Top    Gold Top - SST    Light Blue Top         Additional orders considered but not ordered: None      ED Course:    Consultants: Hospital medicine Dr. Jang    ED Course as of 06/28/25 1905   Sat Jun 28, 2025   1500 WBC(!): 11.88 [TG]   1554 Procalcitonin(!): 0.29 [TG]      ED Course User Index  [TG] Henry Lowe PA-C              Shared Decision Making:  After my consideration of clinical presentation and any laboratory/radiology studies obtained, I discussed the findings with the patient/patient representative who is in agreement with the treatment plan and the final disposition.   Risks and benefits of discharge and/or observation/admission were discussed.       AS OF 19:05 EDT VITALS:    BP - 139/71  HR - 75  TEMP - 99.4 °F (37.4 °C) (Oral)  O2 SATS - 95%                  DIAGNOSIS  Final diagnoses:   Pneumonia of both lungs due to infectious organism, unspecified part of lung   Acute hypoxic respiratory failure         DISPOSITION  Admit[facility]      Please note that portions of this document were completed with voice recognition software.      Henry Lowe PA-C  06/28/25 1908

## 2025-06-29 LAB
ANION GAP SERPL CALCULATED.3IONS-SCNC: 13.7 MMOL/L (ref 5–15)
BASOPHILS # BLD AUTO: 0.02 10*3/MM3 (ref 0–0.2)
BASOPHILS NFR BLD AUTO: 0.2 % (ref 0–1.5)
BUN SERPL-MCNC: 24 MG/DL (ref 8–23)
BUN/CREAT SERPL: 52.2 (ref 7–25)
CALCIUM SPEC-SCNC: 9.5 MG/DL (ref 8.6–10.5)
CHLORIDE SERPL-SCNC: 100 MMOL/L (ref 98–107)
CO2 SERPL-SCNC: 25.3 MMOL/L (ref 22–29)
CREAT SERPL-MCNC: 0.46 MG/DL (ref 0.57–1)
DEPRECATED RDW RBC AUTO: 45.2 FL (ref 37–54)
EGFRCR SERPLBLD CKD-EPI 2021: 91.6 ML/MIN/1.73
EOSINOPHIL # BLD AUTO: 0 10*3/MM3 (ref 0–0.4)
EOSINOPHIL NFR BLD AUTO: 0 % (ref 0.3–6.2)
ERYTHROCYTE [DISTWIDTH] IN BLOOD BY AUTOMATED COUNT: 13.2 % (ref 12.3–15.4)
GLUCOSE BLDC GLUCOMTR-MCNC: 134 MG/DL (ref 70–130)
GLUCOSE BLDC GLUCOMTR-MCNC: 137 MG/DL (ref 70–130)
GLUCOSE SERPL-MCNC: 123 MG/DL (ref 65–99)
HCT VFR BLD AUTO: 36.9 % (ref 34–46.6)
HGB BLD-MCNC: 12.5 G/DL (ref 12–15.9)
IMM GRANULOCYTES # BLD AUTO: 0.05 10*3/MM3 (ref 0–0.05)
IMM GRANULOCYTES NFR BLD AUTO: 0.4 % (ref 0–0.5)
LYMPHOCYTES # BLD AUTO: 1.04 10*3/MM3 (ref 0.7–3.1)
LYMPHOCYTES NFR BLD AUTO: 9.1 % (ref 19.6–45.3)
MCH RBC QN AUTO: 31 PG (ref 26.6–33)
MCHC RBC AUTO-ENTMCNC: 33.9 G/DL (ref 31.5–35.7)
MCV RBC AUTO: 91.6 FL (ref 79–97)
MONOCYTES # BLD AUTO: 0.65 10*3/MM3 (ref 0.1–0.9)
MONOCYTES NFR BLD AUTO: 5.7 % (ref 5–12)
NEUTROPHILS NFR BLD AUTO: 84.6 % (ref 42.7–76)
NEUTROPHILS NFR BLD AUTO: 9.61 10*3/MM3 (ref 1.7–7)
NRBC BLD AUTO-RTO: 0 /100 WBC (ref 0–0.2)
PLATELET # BLD AUTO: 359 10*3/MM3 (ref 140–450)
PMV BLD AUTO: 9.5 FL (ref 6–12)
POTASSIUM SERPL-SCNC: 4.1 MMOL/L (ref 3.5–5.2)
RBC # BLD AUTO: 4.03 10*6/MM3 (ref 3.77–5.28)
SODIUM SERPL-SCNC: 139 MMOL/L (ref 136–145)
WBC NRBC COR # BLD AUTO: 11.37 10*3/MM3 (ref 3.4–10.8)

## 2025-06-29 PROCEDURE — 82948 REAGENT STRIP/BLOOD GLUCOSE: CPT

## 2025-06-29 PROCEDURE — 25010000002 DOXYCYCLINE 100 MG RECONSTITUTED SOLUTION 1 EACH VIAL: Performed by: FAMILY MEDICINE

## 2025-06-29 PROCEDURE — 80048 BASIC METABOLIC PNL TOTAL CA: CPT | Performed by: FAMILY MEDICINE

## 2025-06-29 PROCEDURE — 25010000002 AMPICILLIN-SULBACTAM PER 1.5 G: Performed by: FAMILY MEDICINE

## 2025-06-29 PROCEDURE — 99233 SBSQ HOSP IP/OBS HIGH 50: CPT | Performed by: INTERNAL MEDICINE

## 2025-06-29 PROCEDURE — 85025 COMPLETE CBC W/AUTO DIFF WBC: CPT | Performed by: FAMILY MEDICINE

## 2025-06-29 PROCEDURE — 25810000003 SODIUM CHLORIDE 0.9 % SOLUTION 250 ML FLEX CONT: Performed by: FAMILY MEDICINE

## 2025-06-29 RX ORDER — METHENAMINE HIPPURATE 1000 MG/1
1 TABLET ORAL 2 TIMES DAILY WITH MEALS
COMMUNITY

## 2025-06-29 RX ORDER — QUETIAPINE FUMARATE 25 MG/1
25 TABLET, FILM COATED ORAL 2 TIMES DAILY
Status: ON HOLD | COMMUNITY
End: 2025-07-03

## 2025-06-29 RX ORDER — DIVALPROEX SODIUM 125 MG/1
125 CAPSULE, COATED PELLETS ORAL EVERY 12 HOURS SCHEDULED
Status: DISCONTINUED | OUTPATIENT
Start: 2025-06-29 | End: 2025-07-03 | Stop reason: HOSPADM

## 2025-06-29 RX ORDER — BUSPIRONE HYDROCHLORIDE 10 MG/1
10 TABLET ORAL 3 TIMES DAILY
COMMUNITY

## 2025-06-29 RX ORDER — LORAZEPAM 0.5 MG/1
0.5 TABLET ORAL EVERY 6 HOURS PRN
Status: DISCONTINUED | OUTPATIENT
Start: 2025-06-29 | End: 2025-07-03 | Stop reason: HOSPADM

## 2025-06-29 RX ORDER — QUETIAPINE FUMARATE 25 MG/1
25 TABLET, FILM COATED ORAL EVERY 12 HOURS PRN
Status: DISCONTINUED | OUTPATIENT
Start: 2025-06-29 | End: 2025-07-01

## 2025-06-29 RX ORDER — BUSPIRONE HYDROCHLORIDE 10 MG/1
10 TABLET ORAL 3 TIMES DAILY
Status: DISCONTINUED | OUTPATIENT
Start: 2025-06-29 | End: 2025-07-03 | Stop reason: HOSPADM

## 2025-06-29 RX ORDER — OXYBUTYNIN CHLORIDE 5 MG/1
5 TABLET, EXTENDED RELEASE ORAL DAILY
COMMUNITY

## 2025-06-29 RX ORDER — DIVALPROEX SODIUM 125 MG/1
250 CAPSULE, COATED PELLETS ORAL 3 TIMES DAILY
COMMUNITY

## 2025-06-29 RX ADMIN — Medication 10 ML: at 20:02

## 2025-06-29 RX ADMIN — AMPICILLIN SODIUM AND SULBACTAM SODIUM 3 G: 2; 1 INJECTION, POWDER, FOR SOLUTION INTRAMUSCULAR; INTRAVENOUS at 09:22

## 2025-06-29 RX ADMIN — DOXYCYCLINE 100 MG: 100 INJECTION, POWDER, LYOPHILIZED, FOR SOLUTION INTRAVENOUS at 18:07

## 2025-06-29 RX ADMIN — LORAZEPAM 0.5 MG: 0.5 TABLET ORAL at 13:32

## 2025-06-29 RX ADMIN — DOXYCYCLINE 100 MG: 100 INJECTION, POWDER, LYOPHILIZED, FOR SOLUTION INTRAVENOUS at 06:14

## 2025-06-29 RX ADMIN — AMPICILLIN SODIUM AND SULBACTAM SODIUM 3 G: 2; 1 INJECTION, POWDER, FOR SOLUTION INTRAMUSCULAR; INTRAVENOUS at 14:48

## 2025-06-29 RX ADMIN — SODIUM CHLORIDE 40 ML: 9 INJECTION, SOLUTION INTRAVENOUS at 20:10

## 2025-06-29 RX ADMIN — Medication 10 ML: at 09:23

## 2025-06-29 RX ADMIN — AMPICILLIN SODIUM AND SULBACTAM SODIUM 3 G: 2; 1 INJECTION, POWDER, FOR SOLUTION INTRAMUSCULAR; INTRAVENOUS at 20:03

## 2025-06-29 RX ADMIN — DIVALPROEX SODIUM 125 MG: 125 CAPSULE, COATED PELLETS ORAL at 20:02

## 2025-06-29 RX ADMIN — AMPICILLIN SODIUM AND SULBACTAM SODIUM 3 G: 2; 1 INJECTION, POWDER, FOR SOLUTION INTRAMUSCULAR; INTRAVENOUS at 02:02

## 2025-06-29 RX ADMIN — BUSPIRONE HYDROCHLORIDE 10 MG: 10 TABLET ORAL at 20:02

## 2025-06-29 RX ADMIN — LORAZEPAM 0.5 MG: 0.5 TABLET ORAL at 20:02

## 2025-06-29 RX ADMIN — SODIUM CHLORIDE 40 ML: 9 INJECTION, SOLUTION INTRAVENOUS at 02:04

## 2025-06-29 RX ADMIN — Medication 10 ML: at 02:02

## 2025-06-29 NOTE — CASE MANAGEMENT/SOCIAL WORK
Discharge Planning Assessment   [place]     Patient Name: Kristine Hahn  MRN: [id number]  Today's Date: 6/29/2025    Admit Date: 6/28/2025    Plan: Transfer to LT at [facility]   Discharge Needs Assessment       Row Name 06/29/25 1137       Living Environment    People in Home facility resident    Current Living Arrangements extended care facility    In the past 12 months has the electric, gas, oil, or water company threatened to shut off services in your home? No    Primary Care Provided by other (see comments)    Provides Primary Care For no one, unable/limited ability to care for self    Family Caregiver if Needed child(gifty), adult    Quality of Family Relationships involved    Able to Return to Prior Arrangements yes       Resource/Environmental Concerns    Resource/Environmental Concerns none    Transportation Concerns none       Transportation Needs    In the past 12 months, has lack of transportation kept you from medical appointments or from getting medications? no    In the past 12 months, has lack of transportation kept you from meetings, work, or from getting things needed for daily living? No       Food Insecurity    Within the past 12 months, you worried that your food would run out before you got the money to buy more. Never true    Within the past 12 months, the food you bought just didn't last and you didn't have money to get more. Never true       Transition Planning    Patient/Family Anticipates Transition to long-term care facility    Transportation Anticipated health plan transportation       Discharge Needs Assessment    Readmission Within the Last 30 Days no previous admission in last 30 days    Current Outpatient/Agency/Support Group skilled nursing facility    Equipment Currently Used at Home hospital bed;wheelchair    Concerns to be Addressed discharge planning    Do you want help finding or keeping work or a job? I do not need or want help    Do you want help with  school or training? For example, starting or completing job training or getting a high school diploma, GED or equivalent No    Anticipated Changes Related to Illness inability to care for self    Discharge Facility/Level of Care Needs nursing facility, skilled;nursing facility, intermediate    Provided Post Acute Provider List? N/A    Provided Post Acute Provider Quality & Resource List? N/A    Current Discharge Risk chronically ill;cognitively impaired                   Discharge Plan       Row Name 06/29/25 1142       Plan    Plan Transfer to LTC at [facility]    Patient/Family in Agreement with Plan yes    Plan Comments Spoke to patient  and son  at bedside regarding discharge plans She is in long term care at [facility] .She is WC  Dependent . She has been there since 3/2024 .Plan is to return there when DC                  Continued Care and Services - Admitted Since 6/28/2025       Destination Coordination complete.      Service Provider Request Status Services Address Phone Fax Patient Preferred    Psychiatric  Selected Nursing Home [address] [place] [id number] [phone] --                     Demographic Summary       Row Name 06/29/25 1135       General Information    Admission Type inpatient    Arrived From emergency department    Required Notices Provided Important Message from Medicare    Referral Source admission list    Reason for Consult discharge planning    Preferred Language English                   Functional Status       Row Name 06/29/25 1136       Functional Status    Usual Activity Tolerance poor    Current Activity Tolerance poor       Physical Activity    On average, how many days per week do you engage in moderate to strenuous exercise (like a brisk walk)? 0 days    On average, how many minutes do you engage in exercise at this level? 0 min    Number of minutes of exercise per week 0       Functional  Status, IADL    Medications completely dependent    Meal Preparation completely dependent    Housekeeping completely dependent    Laundry completely dependent    Shopping completely dependent    If for any reason you need help with day-to-day activities such as bathing, preparing meals, shopping, managing finances, etc., do you get the help you need? I get all the help I need                   Psychosocial    No documentation.                  Abuse/Neglect    No documentation.                  Legal    No documentation.                  Substance Abuse    No documentation.                  Patient Forms    No documentation.                     Fany Mcpherson RN

## 2025-06-29 NOTE — PROGRESS NOTES
[facility]  INTERNAL MEDICINE PROGRESS NOTE    Name:  Kristine Hahn   Age:  89 y.o.  Sex:  female  :  1935  MRN:  [id number]   Visit Number:  [id number]  Admission Date:  2025  Date Of Service:  25  Primary Care Physician:  Felton Choe MD     LOS: 1 day :  Patient Care Team:  Felton Choe MD as PCP - General (Family Medicine):      Subjective / Interval History:     89-year-old female transferred by ambulance from nursing home due to concern for confusion and shortness of breath. Patient is requiring 3 L nasal cannula which represents a new oxygen requirement as she typically does not require O2.  Patient states she has been short of breath over the last 24 hours, has been progressively worsening.  Denies any chest pain or any other concerns.  Patient denies any recent sick contacts, travel history, or medication changes.     In the emergency department, Labs show mild elevations in white count and procalcitonin otherwise largely unremarkable. CT PE study shows left lower lobe and right middle lobe infiltrates concerning for pneumonia.  Patient was started patient on Rocephin and doxycycline.  Hospitalist services consulted for admission for acute hypoxic respiratory failure secondary to pneumonia.    Patient has been seen on .  She says that she is coughing a lot.  She says she is scared of dying.  She has been hemodynamically stable and has been more confused.      Vital Signs:    Temp:  [98.1 °F (36.7 °C)-99.5 °F (37.5 °C)] 98.5 °F (36.9 °C)  Heart Rate:  [75-97] 97  Resp:  [16-18] 16  BP: (125-176)/(71-92) 173/89    Intake and output:    I/O last 3 completed shifts:  In: 220 [P.O.:120; IV Piggyback:100]  Out: -   I/O this shift:  In: 140 [P.O.:140]  Out: -     Physical Examination:    General Appearance:  Awake and cooperative, not in any acute distress.   Head:    Atraumatic and normocephalic, without obvious abnormality.   Eyes:            PERRLA,  No  pallor. Extraocular movements are within normal limits.   Neck:   Supple,  No lymph glands, no bruit.   Lungs:     Chest shape is normal. Breath sounds heard bilaterally equally.  Poor inspiratory effort and fine crepitation on right    Heart:    Normal S1 and S2, no murmur, no JVD.   Abdomen:     Normal bowel sounds, no masses, no organomegaly. Soft     nontender, no guarding, no rebound tenderness.   Extremities:   Moves all extremities well, no edema, no cyanosis,    Skin:   No  bruising or rash.   Neurologic:   Grossly nonfocal and moves all extremities.      Laboratory results:  Results from last 7 days   Lab Units 06/29/25  0748 06/28/25  1452   SODIUM mmol/L 139 135*   POTASSIUM mmol/L 4.1 3.9   CHLORIDE mmol/L 100 98   CO2 mmol/L 25.3 26.6   BUN mg/dL 24.0* 21.0   CREATININE mg/dL 0.46* 0.53*   CALCIUM mg/dL 9.5 8.8   BILIRUBIN mg/dL  --  0.7   ALK PHOS U/L  --  89   ALT (SGPT) U/L  --  15   AST (SGOT) U/L  --  36*   GLUCOSE mg/dL 123* 103*     Results from last 7 days   Lab Units 06/29/25  0748 06/28/25  1452   WBC 10*3/mm3 11.37* 11.88*   HEMOGLOBIN g/dL 12.5 11.9*   HEMATOCRIT % 36.9 35.0   PLATELETS 10*3/mm3 359 311         Results from last 7 days   Lab Units 06/28/25  1625 06/28/25  1452   HSTROP T ng/L 19* 20*           Radiology results:    Imaging Results (Last 24 Hours)       Procedure Component Value Units Date/Time    CT Abdomen Pelvis With Contrast [[id number]] Collected: 06/28/25 1810     Updated: 06/28/25 1812    Narrative:      FINAL REPORT    TECHNIQUE:  null    CLINICAL HISTORY:  RUQ/RLQ pain    COMPARISON:  null    FINDINGS:  CT abdomen and pelvis with contrast    Comparison: None provided    Findings:    Findings at the lung bases are reported separately.    Unremarkable gallbladder and solid organs. No urolithiasis.    No bowel obstruction, pneumoperitoneum, or pneumatosis.    Small bilateral femoral hernias containing fat. Poorly distended urinary bladder. Otherwise unremarkable pelvic  contents. The appendix is not definitively seen. There are no secondary findings to suggest appendicitis.    The bones are intact. Chronic deformity of the left femoral head. Advanced arthritic changes of the left hip.      Impression:      IMPRESSION:    No acute abdominal disease.    Authenticated and Electronically Signed by Lili Hughes MD on  06/28/2025 06:10:48 PM    CT Angiogram Chest Pulmonary Embolism [[id number]] Collected: 06/28/25 1808     Updated: 06/28/25 1809    Narrative:      FINAL REPORT    TECHNIQUE:  null    CLINICAL HISTORY:  Pulmonary Embolism    COMPARISON:  null    FINDINGS:  CT angiography chest with contrast. 3D Postprocessing.    Comparison: None provided    Findings:    The heart is mildly enlarged.    The thoracic aorta is normal caliber.    No pulmonary artery filling defects.    1.3 cm nodule within the left lobe of the thyroid gland. Multiple mildly enlarged lymph nodes within the mediastinum. Lymph nodes measure up to 12 mm in short axis dimension. There is mild generalized tracheal narrowing.    There are small bilateral pleural effusions, left much larger than right. Adjacent compressive atelectasis within the left lower lobe. Small focus of consolidation and adjacent ground-glass opacity within the medial aspect of the left upper lobe.   Consolidation and volume loss involving a portion of the right middle lobe. Bandlike atelectasis within the posterior aspect of the right upper lobe. Bronchiolar inflammation and nodularity within the left lower lobe.    The visualized upper abdomen is unremarkable.    No acute fractures.      Impression:      IMPRESSION:    1. No evidence of pulmonary artery embolism.    2. There are foci of infiltrate within the left upper and lower lobes. Consolidation and volume loss is also present involving a portion of the right middle lobe. This may be secondary to atelectasis and/or infiltrates.    3. There is a mild degree of mediastinal  lymphadenopathy.    4. Small bilateral pleural effusions, left larger than right.    Authenticated and Electronically Signed by Lili Hughes MD on  06/28/2025 06:08:20 PM    CT Head Without Contrast [[id number]] Collected: 06/28/25 1554     Updated: 06/28/25 1601    Narrative:      HEAD CT     6/28/2025 3:38 PM      HISTORY:  AMS, hypoxia Altered mental status.     TECHNIQUE:  Multiple axial CT images were performed from the foramen magnum to the  vertex. Coronal reformatted images were reconstructed from axial data  set. This study was performed with techniques to keep radiation doses as  low as reasonably achievable (ALARA). Individualized dose reduction  techniques using automated exposure control or adjustment of mA and/or  kV according to the patient size were employed. 526.08 mGy.cm     COMPARISON:  None     FINDINGS:  Motion artifact. No acute intracranial hemorrhage or large acute  cortical infarct. Chronic small vessel ischemic white matter changes and  generalized cerebral volume loss are present. Ventricles are enlarged  including bilateral lateral ventricles, third and fourth ventricle.. No  midline shift. The basal cisterns are patent. Intracranial arterial  atherosclerotic calcifications. Small calcified extra-axial mass along  the right frontal convexity, measuring 10 mm, likely presenting a  meningioma. Bilateral globe lens surgery.     No skull fracture. Chronic bilateral maxillary sinus mucoperiosteal  thickening. Moderate opacification of the right anterior and posterior  ethmoidal air cells. The renal visualized paranasal sinuses and mastoid  air cells are clear.       Impression:      No acute intracranial abnormality. Chronic microvascular ischemic white  matter changes with global volume loss and moderate ventriculomegaly.  Other findings as above        CRITICAL RESULT:  No.     COMMUNICATION:  Per this written report.     Images personally reviewed, interpreted, and dictated by  JOSS Whipple.                                   This report was signed and finalized on 6/28/2025 3:59 PM by JOSS Whipple.       XR Chest 1 View [[id number]] Collected: 06/28/25 1511     Updated: 06/28/25 1516    Narrative:      CLINICAL INDICATION:    Weak/Dizzy/AMS triage protocol     EXAMINATION TECHNIQUE:  XR CHEST 1 VW-     COMPARISON:  None.     FINDINGS:  Mild cardiomegaly. Aortic atherosclerosis and tortuosity. Perihilar  vascular engorgement. No overt edema. No pneumothorax. No large pleural  effusion. Right midlung and medial left lower lung zone patchy  opacities. Moderate degenerative changes of the spine. Moderate  degenerative changes of the bilateral shoulder joints. No free air under  the hemidiaphragms.       Impression:      Mild cardiomegaly. Right midlung and medial left lower lung zone patchy  opacities, atelectasis or pneumonia.     Images personally reviewed, interpreted and dictated by JOSS Whipple.              This report was signed and finalized on 6/28/2025 3:14 PM by JOSS Whipple.               I have reviewed the patient's radiology reports.    Medication Review:     I have reviewed the patient's active and prn medications.     Assessment:      Pneumonia    Acute hypoxic respiratory failure  Community-acquired pneumonia  Acute exacerbation of COPD  Iron deficiency anemia  GERD  Dementia     Plan:     Acute hypoxic respiratory failure  Community-acquired pneumonia  Acute exacerbation of COPD  Patient has new oxygen requirements, 6 L nasal cannula.  Evidence of pneumonia on CT scan.  IV Unasyn, IV doxycycline.  DuoNebs.  Mucinex  Iron deficiency anemia  GERD  Dementia with agitation-   Resume home medications as appropriate.  Will add anxiolytics because of anxiety      Arturo Mojica MD  06/29/25  13:24 EDT      Please note that portions of this note were completed with a voice recognition program.

## 2025-06-29 NOTE — PAYOR COMM NOTE
"  SUBMITTED IN [facility]; AUTH# [id number]; [facility]  UR MANAGER; JOSE HINSON [phone] AND [phone]     NPI:  [id number]  TAX ID:  [id number]    Kathia Hahn (89 y.o. Female)       Date of Birth   1935    Social Security Number       Address   [address] [place] [address]    Home Phone   [phone]    MRN   [id number]       Cheondoism   Unknown    Marital Status   Single                            Admission Date   6/28/2025    Admission Type   Emergency    Admitting Provider   Mike Jang DO    Attending Provider   Arturo Mojica MD    Department, Room/Bed   [facility] TELEMETRY 4, 432/1       Discharge Date       Discharge Disposition       Discharge Destination                                 Attending Provider: Arturo Mojica MD    Allergies: Prednisone    Isolation: None   Infection: None   Code Status: CPR    Ht: 157.5 cm (62\")   Wt: 56.9 kg (125 lb 7.1 oz)    Admission Cmt: None   Principal Problem: Pneumonia [J18.9]                   Active Insurance as of 6/28/2025       Primary Coverage       Payor Plan Insurance Group Employer/Plan Group    MEDICARE MEDICARE A & B        Payor Plan Address Payor Plan Phone Number Payor Plan Fax Number Effective Dates    [address] [phone]  11/1/2000 - None Entered    [facility] [id number]         Subscriber Name Subscriber Birth Date Member ID       KATHIA AHHN 1935 [id number]               Secondary Coverage       Payor Plan Insurance Group Employer/Plan Group    [place]        Payor Plan Address Payor Plan Phone Number Payor Plan Fax Number Effective Dates    [address] [phone]  6/28/2025 - None Entered    [place] [address]         Subscriber Name Subscriber Birth Date Member ID       KATHIA HAHN 1935 [id number]                     Emergency Contacts        (Rel.) Home Phone Work Phone Mobile Phone    " JASON HAHN (Son) [phone] -- --                 History & Physical        Mike Jang DO at 25 1843            [facility] HOSPITALIST   HISTORY AND PHYSICAL      Name:  Kristine Hahn   Age:  89 y.o.  Sex:  female  :  1935  MRN:  [id number]   Visit Number:  [id number]  Admission Date:  2025  Date Of Service:  25  Primary Care Physician:  Felton Choe MD    Chief Complaint:     Dyspnea    History Of Presenting Illness:      Patient is an 89-year-old female transferred by ambulance from nursing home due to concern for confusion and shortness of breath. Patient is requiring 3 L nasal cannula which represents a new oxygen requirement as she typically does not require O2.  Patient states she has been short of breath over the last 24 hours, has been progressively worsening.  Denies any chest pain or any other concerns.  Patient denies any recent sick contacts, travel history, or medication changes.    In the emergency department, Labs show mild elevations in white count and procalcitonin otherwise largely unremarkable. CT PE study shows left lower lobe and right middle lobe infiltrates concerning for pneumonia.  Patient was started patient on Rocephin and doxycycline.  Hospitalist services consulted for admission for acute hypoxic respiratory failure secondary to pneumonia.    Review Of Systems:    All systems were reviewed and negative except as mentioned in history of presenting illness, assessment and plan.    Past Medical History: Patient  has a past medical history of Anemia, Arthritis, Cancer, GERD (gastroesophageal reflux disease), and Glaucoma.    Past Surgical History: Patient  has no past surgical history on file.    Social History: Patient  reports that she has never smoked. She has never used smokeless tobacco. She reports that she does not drink alcohol and does not use drugs.    Family History:  Patient's family history has been reviewed and found to  be noncontributory.     Allergies:      Prednisone    Home Medications:    Prior to Admission Medications       Prescriptions Last Dose Informant Patient Reported? Taking?    acetaminophen (TYLENOL) 325 MG tablet   Yes No    Take 2 tablets by mouth Every 6 (Six) Hours As Needed for Mild Pain. Indications: Pain    albuterol (PROVENTIL) (2.5 MG/3ML) 0.083% nebulizer solution   No No    Take 2.5 mg by nebulization Every 4 (Four) Hours As Needed for Wheezing.    albuterol sulfate  (90 Base) MCG/ACT inhaler   No No    Inhale 2 puffs Every 4 (Four) Hours As Needed for Wheezing.    aspirin 81 MG chewable tablet   Yes No    Chew 1 tablet every day by oral route.    Diclofenac Sodium 3 % gel gel   No No    Apply 2 g topically to the appropriate area as directed 3 (Three) Times a Day.    famotidine (PEPCID) 20 MG tablet   No No    Take 1 tablet by mouth Daily.    iron polysacch complex-B12-FA (FERREX 150 FORTE) 150-0.025-1 MG capsule capsule   Yes No    Take  by mouth Daily. Indications: Anemia From Inadequate Iron in the Body    ketoconazole (NIZORAL) 2 % cream   No No    Apply  topically to the appropriate area as directed Daily.    Latanoprost 0.005 % emulsion   No No    Administer 1 drop to both eyes Daily.    loratadine (CLARITIN) 10 MG tablet   No No    Take 1 tablet by mouth Daily.    Multiple Vitamins-Minerals (PreserVision AREDS 2) chewable tablet   No No    Chew 1 each Daily With Breakfast & Dinner.    polyethylene glycol (MIRALAX) 17 g packet   Yes No    Take 17 g by mouth Daily. Indications: Constipation    polyvinyl alcohol (LIQUIFILM) 1.4 % ophthalmic solution   Yes No    1 drop As Needed for Dry Eyes.    simethicone (MYLICON) 80 MG chewable tablet   Yes No    Chew 1 tablet Every 6 (Six) Hours As Needed for Flatulence. Indications: Gas    timolol (Betimol) 0.5 % ophthalmic solution   No No    Administer 1 drop to both eyes Daily.    Umeclidinium-Vilanterol (Anoro Ellipta) 62.5-25 MCG/ACT aerosol powder   "inhaler   No No    Inhale 1 puff Daily.    verapamil (CALAN) 80 MG tablet   No No    Take 1 tablet by mouth 3 (Three) Times a Day.    vitamin C (ASCORBIC ACID) 250 MG tablet   Yes No    Take 1 tablet by mouth Daily.    Vitamin D, Cholecalciferol, (CHOLECALCIFEROL) 10 MCG (400 UNIT) tablet   Yes No    Take 1 tablet by mouth Daily. Indications: Vitamin D Deficiency    Wound Dressings (Medihoney Ca Alginate 2\"x2\") pads   No No    Apply 1 patch topically Daily.          ED Medications:    Medications   sodium chloride 0.9 % flush 10 mL (has no administration in time range)   nitroglycerin (NITROSTAT) SL tablet 0.4 mg (has no administration in time range)   sodium chloride 0.9 % flush 10 mL (has no administration in time range)   sodium chloride 0.9 % flush 10 mL (has no administration in time range)   sodium chloride 0.9 % infusion 40 mL (has no administration in time range)   ondansetron (ZOFRAN) injection 4 mg (has no administration in time range)   Potassium Replacement - Follow Nurse / BPA Driven Protocol (has no administration in time range)   Magnesium Cardiology Dose Replacement - Follow Nurse / BPA Driven Protocol (has no administration in time range)   Phosphorus Replacement - Follow Nurse / BPA Driven Protocol (has no administration in time range)   Calcium Replacement - Follow Nurse / BPA Driven Protocol (has no administration in time range)   ampicillin-sulbactam (UNASYN) 3 g in sodium chloride 0.9 % 100 mL IVPB-VTB (has no administration in time range)   doxycycline (VIBRAMYCIN) 100 mg in sodium chloride 0.9 % 100 mL IVPB-VTB (has no administration in time range)   sennosides-docusate (PERICOLACE) 8.6-50 MG per tablet 2 tablet (has no administration in time range)     And   polyethylene glycol (MIRALAX) packet 17 g (has no administration in time range)     And   bisacodyl (DULCOLAX) EC tablet 5 mg (has no administration in time range)     And   bisacodyl (DULCOLAX) suppository 10 mg (has no administration " "in time range)   acetaminophen (TYLENOL) tablet 650 mg (has no administration in time range)     Or   acetaminophen (TYLENOL) 160 MG/5ML oral solution 650 mg (has no administration in time range)     Or   acetaminophen (TYLENOL) suppository 650 mg (has no administration in time range)   methylPREDNISolone sodium succinate (SOLU-Medrol) injection 125 mg (125 mg Intravenous Given 6/28/25 1530)   ipratropium-albuterol (DUO-NEB) nebulizer solution 3 mL (3 mL Nebulization Given 6/28/25 1512)   iopamidol (ISOVUE-300) 61 % injection 100 mL (75 mL Intravenous Given 6/28/25 1554)   cefTRIAXone (ROCEPHIN) 2,000 mg in sodium chloride 0.9 % 100 mL IVPB-VTB (0 mg Intravenous Stopped 6/28/25 1739)   doxycycline (VIBRAMYCIN) 100 mg in sodium chloride 0.9 % 100 mL IVPB-VTB (100 mg Intravenous New Bag 6/28/25 1739)     Vital Signs:  Temp:  [99.4 °F (37.4 °C)] 99.4 °F (37.4 °C)  Heart Rate:  [75-94] 75  Resp:  [18] 18  BP: (137-164)/(71-92) 139/71        06/28/25  1446   Weight: 43.6 kg (96 lb 1.9 oz)     Body mass index is 17.58 kg/m².    Physical Exam:     Most recent vital Signs: /71   Pulse 75   Temp 99.4 °F (37.4 °C) (Oral)   Resp 18   Ht 157.5 cm (62\")   Wt 43.6 kg (96 lb 1.9 oz)   SpO2 95%   BMI 17.58 kg/m²     Physical Exam  Constitutional: Awake, alert  Eyes: PERRLA, sclerae anicteric, no conjunctival injection  HENT: NCAT, mucous membranes moist  Neck: Supple, no thyromegaly, no lymphadenopathy, trachea midline  Respiratory: Scattered wheezing and rhonchi, worse on the left than right.  Nonlabored respirations   Cardiovascular: RRR, no murmurs, rubs, or gallops, palpable pedal pulses bilaterally  Gastrointestinal: Positive bowel sounds, soft, nontender, nondistended  Musculoskeletal: No bilateral ankle edema, no clubbing or cyanosis to extremities  Psychiatric: Appropriate affect, cooperative  Neurologic: Oriented to person and place, strength symmetric in all extremities, Cranial Nerves grossly intact to " confrontation, speech clear  Skin: No rashes     Laboratory data:    I have reviewed the labs done in the emergency room.    Results from last 7 days   Lab Units 06/28/25  1452   SODIUM mmol/L 135*   POTASSIUM mmol/L 3.9   CHLORIDE mmol/L 98   CO2 mmol/L 26.6   BUN mg/dL 21.0   CREATININE mg/dL 0.53*   CALCIUM mg/dL 8.8   BILIRUBIN mg/dL 0.7   ALK PHOS U/L 89   ALT (SGPT) U/L 15   AST (SGOT) U/L 36*   GLUCOSE mg/dL 103*     Results from last 7 days   Lab Units 06/28/25  1452   WBC 10*3/mm3 11.88*   HEMOGLOBIN g/dL 11.9*   HEMATOCRIT % 35.0   PLATELETS 10*3/mm3 311         Results from last 7 days   Lab Units 06/28/25  1625 06/28/25  1452   HSTROP T ng/L 19* 20*     Results from last 7 days   Lab Units 06/28/25  1452   PROBNP pg/mL 1,505.0     Results from last 7 days   Lab Units 06/28/25  1643   COLOR UA  Yellow   GLUCOSE UA  Negative   KETONES UA  15 mg/dL (1+)*   BLOOD UA  Small (1+)*   LEUKOCYTES UA  Negative   PH, URINE  6.5   BILIRUBIN UA  Negative   UROBILINOGEN UA  1.0 E.U./dL   RBC UA /HPF 0-2   WBC UA /HPF 0-2     Radiology:    CT Abdomen Pelvis With Contrast  Result Date: 6/28/2025  FINAL REPORT TECHNIQUE: null CLINICAL HISTORY: RUQ/RLQ pain COMPARISON: null FINDINGS: CT abdomen and pelvis with contrast Comparison: None provided Findings: Findings at the lung bases are reported separately. Unremarkable gallbladder and solid organs. No urolithiasis. No bowel obstruction, pneumoperitoneum, or pneumatosis. Small bilateral femoral hernias containing fat. Poorly distended urinary bladder. Otherwise unremarkable pelvic contents. The appendix is not definitively seen. There are no secondary findings to suggest appendicitis. The bones are intact. Chronic deformity of the left femoral head. Advanced arthritic changes of the left hip.     IMPRESSION: No acute abdominal disease. Authenticated and Electronically Signed by Lili Hughes MD on 06/28/2025 06:10:48 PM    CT Angiogram Chest Pulmonary Embolism  Result  Date: 6/28/2025  FINAL REPORT TECHNIQUE: null CLINICAL HISTORY: Pulmonary Embolism COMPARISON: null FINDINGS: CT angiography chest with contrast. 3D Postprocessing. Comparison: None provided Findings: The heart is mildly enlarged. The thoracic aorta is normal caliber. No pulmonary artery filling defects. 1.3 cm nodule within the left lobe of the thyroid gland. Multiple mildly enlarged lymph nodes within the mediastinum. Lymph nodes measure up to 12 mm in short axis dimension. There is mild generalized tracheal narrowing. There are small bilateral pleural effusions, left much larger than right. Adjacent compressive atelectasis within the left lower lobe. Small focus of consolidation and adjacent ground-glass opacity within the medial aspect of the left upper lobe. Consolidation and volume loss involving a portion of the right middle lobe. Bandlike atelectasis within the posterior aspect of the right upper lobe. Bronchiolar inflammation and nodularity within the left lower lobe. The visualized upper abdomen is unremarkable. No acute fractures.     IMPRESSION: 1. No evidence of pulmonary artery embolism. 2. There are foci of infiltrate within the left upper and lower lobes. Consolidation and volume loss is also present involving a portion of the right middle lobe. This may be secondary to atelectasis and/or infiltrates. 3. There is a mild degree of mediastinal lymphadenopathy. 4. Small bilateral pleural effusions, left larger than right. Authenticated and Electronically Signed by Lili Hughes MD on 06/28/2025 06:08:20 PM    CT Head Without Contrast  Result Date: 6/28/2025  HEAD CT     6/28/2025 3:38 PM  HISTORY: AMS, hypoxia Altered mental status.  TECHNIQUE: Multiple axial CT images were performed from the foramen magnum to the vertex. Coronal reformatted images were reconstructed from axial data set. This study was performed with techniques to keep radiation doses as low as reasonably achievable (ALARA).  Individualized dose reduction techniques using automated exposure control or adjustment of mA and/or kV according to the patient size were employed. 526.08 mGy.cm  COMPARISON: None  FINDINGS: Motion artifact. No acute intracranial hemorrhage or large acute cortical infarct. Chronic small vessel ischemic white matter changes and generalized cerebral volume loss are present. Ventricles are enlarged including bilateral lateral ventricles, third and fourth ventricle.. No midline shift. The basal cisterns are patent. Intracranial arterial atherosclerotic calcifications. Small calcified extra-axial mass along the right frontal convexity, measuring 10 mm, likely presenting a meningioma. Bilateral globe lens surgery.  No skull fracture. Chronic bilateral maxillary sinus mucoperiosteal thickening. Moderate opacification of the right anterior and posterior ethmoidal air cells. The renal visualized paranasal sinuses and mastoid air cells are clear.      No acute intracranial abnormality. Chronic microvascular ischemic white matter changes with global volume loss and moderate ventriculomegaly. Other findings as above   CRITICAL RESULT: No.  COMMUNICATION: Per this written report.  Images personally reviewed, interpreted, and dictated by JOSS Whipple.            This report was signed and finalized on 6/28/2025 3:59 PM by JOSS Whipple.      XR Chest 1 View  Result Date: 6/28/2025  CLINICAL INDICATION:  Weak/Dizzy/AMS triage protocol  EXAMINATION TECHNIQUE: XR CHEST 1 VW-  COMPARISON: None.  FINDINGS: Mild cardiomegaly. Aortic atherosclerosis and tortuosity. Perihilar vascular engorgement. No overt edema. No pneumothorax. No large pleural effusion. Right midlung and medial left lower lung zone patchy opacities. Moderate degenerative changes of the spine. Moderate degenerative changes of the bilateral shoulder joints. No free air under the hemidiaphragms.      Mild cardiomegaly. Right midlung and medial left  lower lung zone patchy opacities, atelectasis or pneumonia.  Images personally reviewed, interpreted and dictated by JOSS Whipple.     This report was signed and finalized on 2025 3:14 PM by JOSS Whipple.        Assessment:    Acute hypoxic respiratory failure  Community-acquired pneumonia  Acute exacerbation of COPD  Iron deficiency anemia  GERD    Plan:    Acute hypoxic respiratory failure  Community-acquired pneumonia  Acute exacerbation of COPD  Patient has new oxygen requirements, 6 L nasal cannula.  Evidence of pneumonia on CT scan.  IV Unasyn, IV doxycycline.  DuoNebs.  Mucinex  Iron deficiency anemia  GERD  Resume home medications as appropriate.    Risk Assessment: High  DVT Prophylaxis: SCDs  Code Status: Full code  Diet: Consistent carb, cardiac    Mike Jang DO  25  18:43 EDT    Dictated utilizing Dragon dictation.    Electronically signed by Mike Jang DO at 25 2123          Emergency Department Notes        Henry Lowe PA-C at 25 1458        Procedure Orders    1. Critical Care [[id number]] ordered by Henry Lowe PA-C                  EMERGENCY DEPARTMENT ENCOUNTER    Pt Name: Kristine Hahn  MRN: [id number]  Pt :   1935  Room Number:    Date of encounter:  2025  PCP: Felton Choe MD  ED Provider: Henry Lowe PA-C    Historian: Patient, nursing notes, nursing home notes      HPI:  Chief Complaint: Shortness of breath, AMS        Context: Kristine Hahn is a 89 y.o. female who presents to the ED via EMS from nursing home for evaluation of confusion and shortness of breath.  Patient is mildly confused at time of my exam.  She does verbalize shortness of breath but denies chest pain.  She reported bilateral upper abdominal tenderness to palpation.  Patient denies any other medical complaints at this time.      PAST MEDICAL HISTORY  Past Medical History:   Diagnosis Date    Anemia     Arthritis     Cancer      GERD (gastroesophageal reflux disease)     Glaucoma          PAST SURGICAL HISTORY  History reviewed. No pertinent surgical history.      FAMILY HISTORY  Family History   Problem Relation Age of Onset    Arthritis Mother     Arthritis Maternal Grandmother          SOCIAL HISTORY  Social History     Socioeconomic History    Marital status: Single   Tobacco Use    Smoking status: Never    Smokeless tobacco: Never   Vaping Use    Vaping status: Never Used   Substance and Sexual Activity    Alcohol use: Never    Drug use: Never    Sexual activity: Defer         ALLERGIES  Prednisone        REVIEW OF SYSTEMS  Review of Systems   Constitutional:  Negative for chills and fever.   HENT:  Negative for congestion and sore throat.    Respiratory:  Positive for shortness of breath. Negative for cough.    Cardiovascular:  Negative for chest pain.   Gastrointestinal:  Positive for abdominal pain. Negative for nausea and vomiting.   Genitourinary:  Negative for dysuria.   Musculoskeletal:  Negative for back pain.   Skin:  Negative for wound.   Neurological:  Negative for dizziness and headaches.   Psychiatric/Behavioral:  Negative for confusion.    All other systems reviewed and are negative.         All systems reviewed and negative except for those discussed in HPI.       PHYSICAL EXAM    I have reviewed the triage vital signs and nursing notes.    ED Triage Vitals [06/28/25 1446]   Temp Heart Rate Resp BP SpO2   99.4 °F (37.4 °C) 86 18 148/76 95 %      Temp src Heart Rate Source Patient Position BP Location FiO2 (%)   Oral Monitor Lying Left arm --       Physical Exam  Vitals and nursing note reviewed.   Constitutional:       General: She is not in acute distress.     Appearance: She is not ill-appearing, toxic-appearing or diaphoretic.   HENT:      Head: Normocephalic and atraumatic.      Mouth/Throat:      Mouth: Mucous membranes are moist.      Pharynx: Oropharynx is clear.   Eyes:      Extraocular Movements: Extraocular  movements intact.   Cardiovascular:      Rate and Rhythm: Normal rate.      Heart sounds: Normal heart sounds.   Pulmonary:      Effort: Pulmonary effort is normal. No tachypnea or respiratory distress.      Breath sounds: Examination of the right-middle field reveals decreased breath sounds. Examination of the left-middle field reveals decreased breath sounds. Examination of the left-lower field reveals decreased breath sounds. Decreased breath sounds and rales present. No wheezing.   Abdominal:      General: There is no distension.      Tenderness: There is abdominal tenderness. There is no guarding.   Skin:     General: Skin is warm and dry.      Findings: No rash.   Neurological:      Mental Status: She is alert.             LAB RESULTS  Recent Results (from the past 24 hours)   Comprehensive Metabolic Panel    Collection Time: 06/28/25  2:52 PM    Specimen: Blood   Result Value Ref Range    Glucose 103 (H) 65 - 99 mg/dL    BUN 21.0 8.0 - 23.0 mg/dL    Creatinine 0.53 (L) 0.57 - 1.00 mg/dL    Sodium 135 (L) 136 - 145 mmol/L    Potassium 3.9 3.5 - 5.2 mmol/L    Chloride 98 98 - 107 mmol/L    CO2 26.6 22.0 - 29.0 mmol/L    Calcium 8.8 8.6 - 10.5 mg/dL    Total Protein 6.8 6.0 - 8.5 g/dL    Albumin 3.6 3.5 - 5.2 g/dL    ALT (SGPT) 15 1 - 33 U/L    AST (SGOT) 36 (H) 1 - 32 U/L    Alkaline Phosphatase 89 39 - 117 U/L    Total Bilirubin 0.7 0.0 - 1.2 mg/dL    Globulin 3.2 gm/dL    A/G Ratio 1.1 g/dL    BUN/Creatinine Ratio 39.6 (H) 7.0 - 25.0    Anion Gap 10.4 5.0 - 15.0 mmol/L    eGFR 88.5 >60.0 mL/min/1.73   High Sensitivity Troponin T    Collection Time: 06/28/25  2:52 PM    Specimen: Blood   Result Value Ref Range    HS Troponin T 20 (H) <14 ng/L   Magnesium    Collection Time: 06/28/25  2:52 PM    Specimen: Blood   Result Value Ref Range    Magnesium 2.0 1.6 - 2.4 mg/dL   Green Top (Gel)    Collection Time: 06/28/25  2:52 PM   Result Value Ref Range    Extra Tube Hold for add-ons.    Lavender Top    Collection  Time: 06/28/25  2:52 PM   Result Value Ref Range    Extra Tube hold for add-on    Gold Top - SST    Collection Time: 06/28/25  2:52 PM   Result Value Ref Range    Extra Tube Hold for add-ons.    Light Blue Top    Collection Time: 06/28/25  2:52 PM   Result Value Ref Range    Extra Tube Hold for add-ons.    CBC Auto Differential    Collection Time: 06/28/25  2:52 PM    Specimen: Blood   Result Value Ref Range    WBC 11.88 (H) 3.40 - 10.80 10*3/mm3    RBC 3.86 3.77 - 5.28 10*6/mm3    Hemoglobin 11.9 (L) 12.0 - 15.9 g/dL    Hematocrit 35.0 34.0 - 46.6 %    MCV 90.7 79.0 - 97.0 fL    MCH 30.8 26.6 - 33.0 pg    MCHC 34.0 31.5 - 35.7 g/dL    RDW 13.4 12.3 - 15.4 %    RDW-SD 45.0 37.0 - 54.0 fl    MPV 9.1 6.0 - 12.0 fL    Platelets 311 140 - 450 10*3/mm3    Neutrophil % 76.0 42.7 - 76.0 %    Lymphocyte % 9.2 (L) 19.6 - 45.3 %    Monocyte % 13.7 (H) 5.0 - 12.0 %    Eosinophil % 0.2 (L) 0.3 - 6.2 %    Basophil % 0.5 0.0 - 1.5 %    Immature Grans % 0.4 0.0 - 0.5 %    Neutrophils, Absolute 9.03 (H) 1.70 - 7.00 10*3/mm3    Lymphocytes, Absolute 1.09 0.70 - 3.10 10*3/mm3    Monocytes, Absolute 1.63 (H) 0.10 - 0.90 10*3/mm3    Eosinophils, Absolute 0.02 0.00 - 0.40 10*3/mm3    Basophils, Absolute 0.06 0.00 - 0.20 10*3/mm3    Immature Grans, Absolute 0.05 0.00 - 0.05 10*3/mm3    nRBC 0.0 0.0 - 0.2 /100 WBC   Procalcitonin    Collection Time: 06/28/25  2:52 PM    Specimen: Blood   Result Value Ref Range    Procalcitonin 0.29 (H) 0.00 - 0.25 ng/mL   BNP    Collection Time: 06/28/25  2:52 PM    Specimen: Blood   Result Value Ref Range    proBNP 1,505.0 0.0 - 1,800.0 pg/mL   Respiratory Panel PCR w/COVID-19(SARS-CoV-2) MEGAN/KAREN/DEVEN/PAD/COR/BRENDA In-House, NP Swab in UTM/VTM, 2 HR TAT - Swab, Nasopharynx    Collection Time: 06/28/25  3:15 PM    Specimen: Nasopharynx; Swab   Result Value Ref Range    ADENOVIRUS, PCR Not Detected Not Detected    Coronavirus 229E Not Detected Not Detected    Coronavirus HKU1 Not Detected Not Detected     Coronavirus NL63 Not Detected Not Detected    Coronavirus OC43 Not Detected Not Detected    COVID19 Not Detected Not Detected - Ref. Range    Human Metapneumovirus Not Detected Not Detected    Human Rhinovirus/Enterovirus Not Detected Not Detected    Influenza A PCR Not Detected Not Detected    Influenza B PCR Not Detected Not Detected    Parainfluenza Virus 1 Not Detected Not Detected    Parainfluenza Virus 2 Not Detected Not Detected    Parainfluenza Virus 3 Not Detected Not Detected    Parainfluenza Virus 4 Not Detected Not Detected    RSV, PCR Not Detected Not Detected    Bordetella pertussis pcr Not Detected Not Detected    Bordetella parapertussis PCR Not Detected Not Detected    Chlamydophila pneumoniae PCR Not Detected Not Detected    Mycoplasma pneumo by PCR Not Detected Not Detected   High Sensitivity Troponin T 1Hr    Collection Time: 06/28/25  4:25 PM    Specimen: Arm, Right; Blood   Result Value Ref Range    HS Troponin T 19 (H) <14 ng/L    Troponin T Numeric Delta -1 ng/L    Troponin T % Delta -5 Abnormal if >/= 20%   Urinalysis With Culture If Indicated - Urine, Catheter    Collection Time: 06/28/25  4:43 PM    Specimen: Urine, Catheter   Result Value Ref Range    Color, UA Yellow Yellow, Straw    Appearance, UA Clear Clear    pH, UA 6.5 5.0 - 8.0    Specific Gravity, UA >1.030 (H) 1.005 - 1.030    Glucose, UA Negative Negative    Ketones, UA 15 mg/dL (1+) (A) Negative    Bilirubin, UA Negative Negative    Blood, UA Small (1+) (A) Negative    Protein, UA 30 mg/dL (1+) (A) Negative    Leuk Esterase, UA Negative Negative    Nitrite, UA Negative Negative    Urobilinogen, UA 1.0 E.U./dL 0.2 - 1.0 E.U./dL   Urinalysis, Microscopic Only - Urine, Catheter    Collection Time: 06/28/25  4:43 PM    Specimen: Urine, Catheter   Result Value Ref Range    RBC, UA 0-2 None Seen, 0-2 /HPF    WBC, UA 0-2 None Seen, 0-2 /HPF    Bacteria, UA None Seen None Seen /HPF    Squamous Epithelial Cells, UA None Seen None Seen,  0-2 /HPF    Hyaline Casts, UA None Seen None Seen /LPF    Methodology Manual Light Microscopy        If labs were ordered, I independently reviewed the results and considered them in treating the patient.        RADIOLOGY  CT Abdomen Pelvis With Contrast  Result Date: 6/28/2025  FINAL REPORT TECHNIQUE: null CLINICAL HISTORY: RUQ/RLQ pain COMPARISON: null FINDINGS: CT abdomen and pelvis with contrast Comparison: None provided Findings: Findings at the lung bases are reported separately. Unremarkable gallbladder and solid organs. No urolithiasis. No bowel obstruction, pneumoperitoneum, or pneumatosis. Small bilateral femoral hernias containing fat. Poorly distended urinary bladder. Otherwise unremarkable pelvic contents. The appendix is not definitively seen. There are no secondary findings to suggest appendicitis. The bones are intact. Chronic deformity of the left femoral head. Advanced arthritic changes of the left hip.     IMPRESSION: No acute abdominal disease. Authenticated and Electronically Signed by Lili Hughes MD on 06/28/2025 06:10:48 PM    CT Angiogram Chest Pulmonary Embolism  Result Date: 6/28/2025  FINAL REPORT TECHNIQUE: null CLINICAL HISTORY: Pulmonary Embolism COMPARISON: null FINDINGS: CT angiography chest with contrast. 3D Postprocessing. Comparison: None provided Findings: The heart is mildly enlarged. The thoracic aorta is normal caliber. No pulmonary artery filling defects. 1.3 cm nodule within the left lobe of the thyroid gland. Multiple mildly enlarged lymph nodes within the mediastinum. Lymph nodes measure up to 12 mm in short axis dimension. There is mild generalized tracheal narrowing. There are small bilateral pleural effusions, left much larger than right. Adjacent compressive atelectasis within the left lower lobe. Small focus of consolidation and adjacent ground-glass opacity within the medial aspect of the left upper lobe. Consolidation and volume loss involving a portion of the  right middle lobe. Bandlike atelectasis within the posterior aspect of the right upper lobe. Bronchiolar inflammation and nodularity within the left lower lobe. The visualized upper abdomen is unremarkable. No acute fractures.     IMPRESSION: 1. No evidence of pulmonary artery embolism. 2. There are foci of infiltrate within the left upper and lower lobes. Consolidation and volume loss is also present involving a portion of the right middle lobe. This may be secondary to atelectasis and/or infiltrates. 3. There is a mild degree of mediastinal lymphadenopathy. 4. Small bilateral pleural effusions, left larger than right. Authenticated and Electronically Signed by Lili Hughes MD on 06/28/2025 06:08:20 PM    CT Head Without Contrast  Result Date: 6/28/2025  HEAD CT     6/28/2025 3:38 PM  HISTORY: AMS, hypoxia Altered mental status.  TECHNIQUE: Multiple axial CT images were performed from the foramen magnum to the vertex. Coronal reformatted images were reconstructed from axial data set. This study was performed with techniques to keep radiation doses as low as reasonably achievable (ALARA). Individualized dose reduction techniques using automated exposure control or adjustment of mA and/or kV according to the patient size were employed. 526.08 mGy.cm  COMPARISON: None  FINDINGS: Motion artifact. No acute intracranial hemorrhage or large acute cortical infarct. Chronic small vessel ischemic white matter changes and generalized cerebral volume loss are present. Ventricles are enlarged including bilateral lateral ventricles, third and fourth ventricle.. No midline shift. The basal cisterns are patent. Intracranial arterial atherosclerotic calcifications. Small calcified extra-axial mass along the right frontal convexity, measuring 10 mm, likely presenting a meningioma. Bilateral globe lens surgery.  No skull fracture. Chronic bilateral maxillary sinus mucoperiosteal thickening. Moderate opacification of the right  anterior and posterior ethmoidal air cells. The renal visualized paranasal sinuses and mastoid air cells are clear.      No acute intracranial abnormality. Chronic microvascular ischemic white matter changes with global volume loss and moderate ventriculomegaly. Other findings as above   CRITICAL RESULT: No.  COMMUNICATION: Per this written report.  Images personally reviewed, interpreted, and dictated by JOSS Whipple.            This report was signed and finalized on 6/28/2025 3:59 PM by JOSS Whipple.      XR Chest 1 View  Result Date: 6/28/2025  CLINICAL INDICATION:  Weak/Dizzy/AMS triage protocol  EXAMINATION TECHNIQUE: XR CHEST 1 VW-  COMPARISON: None.  FINDINGS: Mild cardiomegaly. Aortic atherosclerosis and tortuosity. Perihilar vascular engorgement. No overt edema. No pneumothorax. No large pleural effusion. Right midlung and medial left lower lung zone patchy opacities. Moderate degenerative changes of the spine. Moderate degenerative changes of the bilateral shoulder joints. No free air under the hemidiaphragms.      Mild cardiomegaly. Right midlung and medial left lower lung zone patchy opacities, atelectasis or pneumonia.  Images personally reviewed, interpreted and dictated by JOSS Whipple.     This report was signed and finalized on 6/28/2025 3:14 PM by JOSS Whipple.        I ordered and independently reviewed the above noted radiographic studies.      I viewed images of chest x-ray which showed right lower lobe and left lower lobe infiltrate per my independent interpretation.    I viewed images of CT PE study which showed right middle lobe and left lower lobe infiltrate, no PE per my independent interpretation    I viewed images of CT abdomen pelvis which was unremarkable per my independent interpretation      I viewed images of CT head scan which showed no acute intracranial abnormalities per my independent interpretation    See radiologist's dictation for official  interpretation.        PROCEDURES    Critical Care    Performed by: Henry Lowe PA-C  Authorized by: Mike Jang DO    Critical care provider statement:     Critical care time (minutes):  42    Critical care time was exclusive of:  Separately billable procedures and treating other patients and teaching time    Critical care was necessary to treat or prevent imminent or life-threatening deterioration of the following conditions:  Respiratory failure    Critical care was time spent personally by me on the following activities:  Interpretation of cardiac output measurements, obtaining history from patient or surrogate, examination of patient, evaluation of patient's response to treatment, discussions with consultants, development of treatment plan with patient or surrogate, blood draw for specimens, ordering and performing treatments and interventions, ordering and review of laboratory studies, ordering and review of radiographic studies, pulse oximetry, re-evaluation of patient's condition and review of old charts    Care discussed with: admitting provider        ECG 12 Lead Altered Mental Status   Final Result          MEDICATIONS GIVEN IN ER    Medications   sodium chloride 0.9 % flush 10 mL (has no administration in time range)   nitroglycerin (NITROSTAT) SL tablet 0.4 mg (has no administration in time range)   sodium chloride 0.9 % flush 10 mL (has no administration in time range)   sodium chloride 0.9 % flush 10 mL (has no administration in time range)   sodium chloride 0.9 % infusion 40 mL (has no administration in time range)   ondansetron (ZOFRAN) injection 4 mg (has no administration in time range)   Potassium Replacement - Follow Nurse / BPA Driven Protocol (has no administration in time range)   Magnesium Cardiology Dose Replacement - Follow Nurse / BPA Driven Protocol (has no administration in time range)   Phosphorus Replacement - Follow Nurse / BPA Driven Protocol (has no administration in time  range)   Calcium Replacement - Follow Nurse / BPA Driven Protocol (has no administration in time range)   ampicillin-sulbactam (UNASYN) 3 g in sodium chloride 0.9 % 100 mL IVPB-VTB (has no administration in time range)   doxycycline (VIBRAMYCIN) 100 mg in sodium chloride 0.9 % 100 mL IVPB-VTB (has no administration in time range)   sennosides-docusate (PERICOLACE) 8.6-50 MG per tablet 2 tablet (has no administration in time range)     And   polyethylene glycol (MIRALAX) packet 17 g (has no administration in time range)     And   bisacodyl (DULCOLAX) EC tablet 5 mg (has no administration in time range)     And   bisacodyl (DULCOLAX) suppository 10 mg (has no administration in time range)   acetaminophen (TYLENOL) tablet 650 mg (has no administration in time range)     Or   acetaminophen (TYLENOL) 160 MG/5ML oral solution 650 mg (has no administration in time range)     Or   acetaminophen (TYLENOL) suppository 650 mg (has no administration in time range)   methylPREDNISolone sodium succinate (SOLU-Medrol) injection 125 mg (125 mg Intravenous Given 6/28/25 1530)   ipratropium-albuterol (DUO-NEB) nebulizer solution 3 mL (3 mL Nebulization Given 6/28/25 1512)   iopamidol (ISOVUE-300) 61 % injection 100 mL (75 mL Intravenous Given 6/28/25 1554)   cefTRIAXone (ROCEPHIN) 2,000 mg in sodium chloride 0.9 % 100 mL IVPB-VTB (0 mg Intravenous Stopped 6/28/25 1739)   doxycycline (VIBRAMYCIN) 100 mg in sodium chloride 0.9 % 100 mL IVPB-VTB (100 mg Intravenous New Bag 6/28/25 1739)         MEDICAL DECISION MAKING, PROGRESS, and CONSULTS    All labs, if obtained, have been independently reviewed by me.  All radiology studies, if obtained, have been reviewed by me and the radiologist dictating the report.  All EKG's, if obtained, have been independently viewed and interpreted by me/my attending physician.      Discussion below represents my analysis of pertinent findings related to patient's condition, differential diagnosis,  treatment plan and final disposition.    Patient is an 89-year-old female presenting to ER via EMS for evaluation of altered mental status and dyspnea.  On exam the patient is seemingly pleasantly confused, her vital signs and pulse oximetry are all stable and within normal limits but patient is requiring 3 L nasal cannula which represents a new oxygen requirement.  She does have decreased breath sounds bilaterally.  DuoNeb and Solu-Medrol were administered.  Chest x-ray was obtained concerning for infiltrate of the right middle and left lower lobe.  Given concern for acute hypoxia and the patient having some generalized upper abdominal tenderness on palpation, CT chest PE study and a CT abdomen pelvis scan were obtained.      Lab work today revealed mild leukocytosis and procalcitonin elevation.  CT abdomen and pelvis was unremarkable but CT PE study confirmed the presence of right middle and left lobe pneumonia.  I therefore started patient on Ceftriaxone/Doxycycline and consulted with hospital medicine Dr. Jang who agreed to admit the patient to [facility] for further management of acute hypoxic respiratory failure secondary to pneumonia.                         Differential diagnosis:    Differential diagnosis included but was not limited to altered mental status, UTI, confusion, dementia, pneumonia, COPD exacerbation, acute heart failure, ACS, arrhythmia      Additional sources:    - Discussed/ obtained information from independent historians: None    - External (non-ED) record review: Previous medical records reviewed    - Chronic or social conditions impacting care: None    Orders placed during this visit:  Orders Placed This Encounter   Procedures    Critical Care    Respiratory Panel PCR w/COVID-19(SARS-CoV-2) MEGAN/KAREN/DEVEN/PAD/COR/BRENDA In-House, NP Swab in UTM/VTM, 2 HR TAT - Swab, Nasopharynx    Blood Culture - Blood,    Blood Culture - Blood,    XR Chest 1 View    CT Angiogram Chest  Pulmonary Embolism    CT Abdomen Pelvis With Contrast    CT Head Without Contrast    Gardner Draw    Comprehensive Metabolic Panel    High Sensitivity Troponin T    Magnesium    CBC Auto Differential    Procalcitonin    BNP    Urinalysis With Culture If Indicated - Urine, Catheter    High Sensitivity Troponin T 1Hr    Urinalysis, Microscopic Only - Urine, Clean Catch    Basic Metabolic Panel    CBC Auto Differential    Blood Gas, Arterial -With Co-Ox Panel: Yes    Lactic Acid, Plasma    Procalcitonin    Diet: Cardiac, Diabetic; Healthy Heart (2-3 Na+); Consistent Carbohydrate; Fluid Consistency: Thin (IDDSI 0)    Undress & Gown    Vital Signs    Orthostatic Blood Pressure    Monitor Blood Pressure    Straight cath    Vital Signs    Telemetry - Place Orders & Notify Provider of Results When Patient Experiences Acute Chest Pain, Dysrhythmia or Respiratory Distress    May Be Off Telemetry for Tests    Intake & Output    Weigh Patient    Saline Lock & Maintain IV Access    Nursing Dysphagia Screening (Complete Prior to Giving Anything By Mouth)    RN to Place Order SLP Consult - Eval & Treat Choosing Reason of RN Dysphagia Screen Failed    Nurse to Call MD or Nutrition Services for Diet if Patient Passes Dysphagia Screen    Place Sequential Compression Device    Maintain Sequential Compression Device    Continuous Pulse Oximetry    Up With Assistance    Daily Weights    Strict Intake & Output    Notify Provider if Patient Requires Greater Than 4 LPM of Oxygen    Pneumonia Education    Tobacco Cessation Education    Oral Care    Code Status and Medical Interventions: CPR (Attempt to Resuscitate); Full Support    Oxygen Therapy- Nasal Cannula; Titrate 1-6 LPM Per SpO2; 90 - 95%    POC Glucose Once    ECG 12 Lead Altered Mental Status    Insert Peripheral IV    Insert Peripheral IV    Inpatient Admission    Fall Precautions    Fall Precautions    CBC & Differential    Green Top (Gel)    Lavender Top    Gold Top - SST     Light Blue Top         Additional orders considered but not ordered: None      ED Course:    Consultants: Hospital medicine Dr. Jang    ED Course as of 06/28/25 1905   Sat Jun 28, 2025   1500 WBC(!): 11.88 [TG]   1554 Procalcitonin(!): 0.29 [TG]      ED Course User Index  [TG] Henry Lowe PA-C              Shared Decision Making:  After my consideration of clinical presentation and any laboratory/radiology studies obtained, I discussed the findings with the patient/patient representative who is in agreement with the treatment plan and the final disposition.   Risks and benefits of discharge and/or observation/admission were discussed.       AS OF 19:05 EDT VITALS:    BP - 139/71  HR - 75  TEMP - 99.4 °F (37.4 °C) (Oral)  O2 SATS - 95%                  DIAGNOSIS  Final diagnoses:   Pneumonia of both lungs due to infectious organism, unspecified part of lung   Acute hypoxic respiratory failure         DISPOSITION  Admit-[facility]      Please note that portions of this document were completed with voice recognition software.      Henry Lowe PA-C  06/28/25 1908      Electronically signed by Henry Lowe PA-C at 06/28/25 1908       Current Facility-Administered Medications   Medication Dose Route Frequency Provider Last Rate Last Admin    acetaminophen (TYLENOL) tablet 650 mg  650 mg Oral Q4H PRN Mike Jang DO        Or    acetaminophen (TYLENOL) 160 MG/5ML oral solution 650 mg  650 mg Oral Q4H PRN Mike Jang DO        Or    acetaminophen (TYLENOL) suppository 650 mg  650 mg Rectal Q4H PRN Mike Jang DO        ampicillin-sulbactam (UNASYN) 3 g in sodium chloride 0.9 % 100 mL IVPB-VTB  3 g Intravenous Q6H Mike Jang DO   3 g at 06/29/25 0922    sennosides-docusate (PERICOLACE) 8.6-50 MG per tablet 2 tablet  2 tablet Oral BID PRN Mike Jang DO        And    polyethylene glycol (MIRALAX) packet 17 g  17 g Oral Daily PRN Mike Jang DO        And    bisacodyl  (DULCOLAX) EC tablet 5 mg  5 mg Oral Daily PRN Mike Jang DO   5 mg at 06/28/25 2104    And    bisacodyl (DULCOLAX) suppository 10 mg  10 mg Rectal Daily PRN Mike Jang DO        Calcium Replacement - Follow Nurse / BPA Driven Protocol   Not Applicable PRN Mike Jang DO        doxycycline (VIBRAMYCIN) 100 mg in sodium chloride 0.9 % 100 mL IVPB-VTB  100 mg Intravenous Q12H Mike Jang DO   100 mg at 06/29/25 0614    Magnesium Cardiology Dose Replacement - Follow Nurse / BPA Driven Protocol   Not Applicable PRMike Anne DO        nitroglycerin (NITROSTAT) SL tablet 0.4 mg  0.4 mg Sublingual Q5 Min PRN Mike Jang DO        ondansetron (ZOFRAN) injection 4 mg  4 mg Intravenous Q6H PRN Mike Jang DO        Phosphorus Replacement - Follow Nurse / BPA Driven Protocol   Not Applicable PRMike Anne DO        Potassium Replacement - Follow Nurse / BPA Driven Protocol   Not Applicable PRMike Anne DO        sodium chloride 0.9 % flush 10 mL  10 mL Intravenous PRN Anderson Oneil MD        sodium chloride 0.9 % flush 10 mL  10 mL Intravenous Q12H Mike Jang DO   10 mL at 06/29/25 0923    sodium chloride 0.9 % flush 10 mL  10 mL Intravenous PRN Mike Jang DO   10 mL at 06/29/25 0202    sodium chloride 0.9 % infusion 40 mL  40 mL Intravenous PRN Mike Jang DO   40 mL at 06/29/25 0204     Physician Progress Notes (last 24 hours)  Notes from 06/28/25 1103 through 06/29/25 1103   No notes of this type exist for this encounter.

## 2025-06-29 NOTE — PLAN OF CARE
Goal Outcome Evaluation:  Plan of Care Reviewed With: patient        Progress: no change  Outcome Evaluation: VSS, 2L NC. Oriented to self, requires reorientation.On IV abx for pna

## 2025-06-30 PROCEDURE — 25010000002 DOXYCYCLINE 100 MG RECONSTITUTED SOLUTION 1 EACH VIAL: Performed by: FAMILY MEDICINE

## 2025-06-30 PROCEDURE — 25810000003 SODIUM CHLORIDE 0.9 % SOLUTION: Performed by: FAMILY MEDICINE

## 2025-06-30 PROCEDURE — 92610 EVALUATE SWALLOWING FUNCTION: CPT

## 2025-06-30 PROCEDURE — 99232 SBSQ HOSP IP/OBS MODERATE 35: CPT | Performed by: FAMILY MEDICINE

## 2025-06-30 PROCEDURE — 25010000002 AMPICILLIN-SULBACTAM PER 1.5 G: Performed by: FAMILY MEDICINE

## 2025-06-30 RX ORDER — FAMOTIDINE 20 MG/1
20 TABLET, FILM COATED ORAL DAILY
Status: DISCONTINUED | OUTPATIENT
Start: 2025-06-30 | End: 2025-07-03 | Stop reason: HOSPADM

## 2025-06-30 RX ORDER — ZINC OXIDE 20 G/100G
1 OINTMENT TOPICAL AS NEEDED
COMMUNITY

## 2025-06-30 RX ORDER — MIRTAZAPINE 7.5 MG/1
7.5 TABLET, FILM COATED ORAL NIGHTLY
COMMUNITY

## 2025-06-30 RX ORDER — OXYBUTYNIN CHLORIDE 5 MG/1
5 TABLET, EXTENDED RELEASE ORAL DAILY
Status: DISCONTINUED | OUTPATIENT
Start: 2025-06-30 | End: 2025-07-03 | Stop reason: HOSPADM

## 2025-06-30 RX ORDER — FERROUS SULFATE 324(65)MG
324 TABLET, DELAYED RELEASE (ENTERIC COATED) ORAL
Status: DISCONTINUED | OUTPATIENT
Start: 2025-07-01 | End: 2025-07-03 | Stop reason: HOSPADM

## 2025-06-30 RX ORDER — FERROUS SULFATE 325(65) MG
325 TABLET ORAL
COMMUNITY

## 2025-06-30 RX ORDER — VERAPAMIL HYDROCHLORIDE 40 MG/1
80 TABLET ORAL 3 TIMES DAILY
Status: DISCONTINUED | OUTPATIENT
Start: 2025-06-30 | End: 2025-07-03 | Stop reason: HOSPADM

## 2025-06-30 RX ORDER — GUAIFENESIN AND DEXTROMETHORPHAN HYDROBROMIDE 10; 100 MG/5ML; MG/5ML
10 SYRUP ORAL EVERY 6 HOURS PRN
COMMUNITY

## 2025-06-30 RX ORDER — ASPIRIN 81 MG/1
81 TABLET, CHEWABLE ORAL DAILY
Status: DISCONTINUED | OUTPATIENT
Start: 2025-06-30 | End: 2025-07-03 | Stop reason: HOSPADM

## 2025-06-30 RX ORDER — CHOLECALCIFEROL (VITAMIN D3) 25 MCG
1000 TABLET ORAL DAILY
Status: DISCONTINUED | OUTPATIENT
Start: 2025-06-30 | End: 2025-07-03 | Stop reason: HOSPADM

## 2025-06-30 RX ORDER — IPRATROPIUM BROMIDE AND ALBUTEROL SULFATE 2.5; .5 MG/3ML; MG/3ML
3 SOLUTION RESPIRATORY (INHALATION) 4 TIMES DAILY PRN
COMMUNITY

## 2025-06-30 RX ADMIN — SODIUM CHLORIDE 40 ML: 9 INJECTION, SOLUTION INTRAVENOUS at 01:54

## 2025-06-30 RX ADMIN — VERAPAMIL HYDROCHLORIDE 80 MG: 40 TABLET ORAL at 20:19

## 2025-06-30 RX ADMIN — AMPICILLIN SODIUM AND SULBACTAM SODIUM 3 G: 2; 1 INJECTION, POWDER, FOR SOLUTION INTRAMUSCULAR; INTRAVENOUS at 20:20

## 2025-06-30 RX ADMIN — Medication 10 ML: at 05:38

## 2025-06-30 RX ADMIN — AMPICILLIN SODIUM AND SULBACTAM SODIUM 3 G: 2; 1 INJECTION, POWDER, FOR SOLUTION INTRAMUSCULAR; INTRAVENOUS at 01:53

## 2025-06-30 RX ADMIN — BUSPIRONE HYDROCHLORIDE 10 MG: 10 TABLET ORAL at 09:16

## 2025-06-30 RX ADMIN — DIVALPROEX SODIUM 125 MG: 125 CAPSULE, COATED PELLETS ORAL at 20:19

## 2025-06-30 RX ADMIN — Medication 1000 UNITS: at 15:09

## 2025-06-30 RX ADMIN — Medication 10 ML: at 01:53

## 2025-06-30 RX ADMIN — Medication 10 ML: at 20:20

## 2025-06-30 RX ADMIN — AMPICILLIN SODIUM AND SULBACTAM SODIUM 3 G: 2; 1 INJECTION, POWDER, FOR SOLUTION INTRAMUSCULAR; INTRAVENOUS at 15:10

## 2025-06-30 RX ADMIN — LORAZEPAM 0.5 MG: 0.5 TABLET ORAL at 15:09

## 2025-06-30 RX ADMIN — Medication 10 ML: at 09:25

## 2025-06-30 RX ADMIN — AMPICILLIN SODIUM AND SULBACTAM SODIUM 3 G: 2; 1 INJECTION, POWDER, FOR SOLUTION INTRAMUSCULAR; INTRAVENOUS at 09:16

## 2025-06-30 RX ADMIN — FAMOTIDINE 20 MG: 20 TABLET, FILM COATED ORAL at 15:10

## 2025-06-30 RX ADMIN — OXYBUTYNIN CHLORIDE 5 MG: 5 TABLET, EXTENDED RELEASE ORAL at 15:10

## 2025-06-30 RX ADMIN — SODIUM CHLORIDE 500 ML: 900 INJECTION, SOLUTION INTRAVENOUS at 13:52

## 2025-06-30 RX ADMIN — BUSPIRONE HYDROCHLORIDE 10 MG: 10 TABLET ORAL at 20:19

## 2025-06-30 RX ADMIN — DIVALPROEX SODIUM 125 MG: 125 CAPSULE, COATED PELLETS ORAL at 09:16

## 2025-06-30 RX ADMIN — DOXYCYCLINE 100 MG: 100 INJECTION, POWDER, LYOPHILIZED, FOR SOLUTION INTRAVENOUS at 05:36

## 2025-06-30 RX ADMIN — VERAPAMIL HYDROCHLORIDE 80 MG: 40 TABLET ORAL at 15:08

## 2025-06-30 RX ADMIN — BUSPIRONE HYDROCHLORIDE 10 MG: 10 TABLET ORAL at 15:09

## 2025-06-30 RX ADMIN — SODIUM CHLORIDE 40 ML: 9 INJECTION, SOLUTION INTRAVENOUS at 05:37

## 2025-06-30 RX ADMIN — ASPIRIN 81 MG CHEWABLE TABLET 81 MG: 81 TABLET CHEWABLE at 15:08

## 2025-06-30 NOTE — PLAN OF CARE
"Goal Outcome Evaluation:  Plan of Care Reviewed With: patient, child        Progress: improving  Outcome Evaluation: Bedside eval of swallow completed, with son at bedside. Son reports patient \"has been getting stranged on her drinks for the last month.\" Has been on a regular/thin diet. SLP sat patient's bed in an upright position, prior to any PO trials, and son stated, \"it seems harder for her to breathe when she's sitting up so she usually stays laying back.\" SLP educated son on patient needing to be upright for all PO intake, due to a reclined position opening airway and increasing risk for aspiration. He stated, \"no one has ever told me that but it makes sense.\" Oral mech completed, with patient's natural teeth present and adequate for chewing. Generalized oral motor weakness. PO trials of thin, puree, regular, and nectar thick liquids. Throat clears and coughing observed with trials of thin. No overt ss aspiration with NTL via straw, spoon, or trial of regular. However, patient is at risk for a decline in swallowing, throughout a meal of regular, because of likely development of increased fatigue from chewing. Recommend: Mechanical soft, with ground meat, and nectar thick liquids. 2. meds crushed in puree, as tolerated. 3. Because of son reporting progressive difficulty with thin liquids and new dx of pna, patient would benefit from a modified barium swallow study 7/1/25, to further assess pharyngeal stage of swallow, prior to returning to SNF. 4. General aspiration and reflux precautions. Discussed with son and RN.    Anticipated Discharge Disposition (SLP): skilled nursing facility          SLP Swallowing Diagnosis: suspected pharyngeal dysphagia (06/30/25 6670)             "

## 2025-06-30 NOTE — THERAPY EVALUATION
Acute Care - Speech Language Pathology   Swallow Initial Evaluation  [place]     Patient Name: Kristine Hahn  : 1935  MRN: [id number]  Today's Date: 2025               Admit Date: 2025    Visit Dx:     ICD-10-CM ICD-9-CM   1. Acute hypoxic respiratory failure  J96.01 518.81   2. Pneumonia of both lungs due to infectious organism, unspecified part of lung  J18.9 483.8     Patient Active Problem List   Diagnosis    Severe late onset Alzheimer's dementia without behavioral disturbance, psychotic disturbance, mood disturbance, or anxiety    COPD (chronic obstructive pulmonary disease)    Primary hypertension    Gastroesophageal reflux disease without esophagitis    Pneumonia     Past Medical History:   Diagnosis Date    Anemia     Arthritis     Cancer     GERD (gastroesophageal reflux disease)     Glaucoma      History reviewed. No pertinent surgical history.    SLP Recommendation and Plan  SLP Swallowing Diagnosis: suspected pharyngeal dysphagia (25)  SLP Diet Recommendation: soft to chew textures, ground, nectar thick liquids (25)  Recommended Precautions and Strategies: upright posture during/after eating, small bites of food and sips of liquid, check mouth frequently for oral residue/pocketing, general aspiration precautions, reflux precautions, fatigue precautions, assist with feeding (25)  SLP Rec. for Method of Medication Administration: meds crushed, with puree, as tolerated (25)     Monitor for Signs of Aspiration: yes, notify SLP if any concerns (25)  Recommended Diagnostics: VFSS (MBS), other (see comments) (25) (25)  Swallow Criteria for Skilled Therapeutic Interventions Met: demonstrates skilled criteria (25)  Anticipated Discharge Disposition (SLP): skilled nursing facility (25)  Rehab Potential/Prognosis, Swallowing: adequate, monitor progress closely (25)  Therapy Frequency  "(Swallow): PRN (06/30/25 0945)  Predicted Duration Therapy Intervention (Days): until discharge (06/30/25 0945)  Oral Care Recommendations: Oral Care BID/PRN, Suction toothbrush (06/30/25 0945)                                        Progress: improving  Outcome Evaluation: Bedside eval of swallow completed, with son at bedside. Son reports patient \"has been getting stranged on her drinks for the last month.\" Has been on a regular/thin diet. SLP sat patient's bed in an upright position, prior to any PO trials, and son stated, \"it seems harder for her to breathe when she's sitting up so she usually stays laying back.\" SLP educated son on patient needing to be upright for all PO intake, due to a reclined position opening airway and increasing risk for aspiration. He stated, \"no one has ever told me that but it makes sense.\" Oral mech completed, with patient's natural teeth present and adequate for chewing. Generalized oral motor weakness. PO trials of thin, puree, regular, and nectar thick liquids. Throat clears and coughing observed with trials of thin. No overt ss aspiration with NTL via straw, spoon, or trial of regular. However, patient is at risk for a decline in swallowing, throughout a meal of regular, because of likely development of increased fatigue from chewing. Recommend: Mechanical soft, with ground meat, and nectar thick liquids. 2. meds crushed in puree, as tolerated. 3. Because of son reporting progressive difficulty with thin liquids and new dx of pna, patient would benefit from a modified barium swallow study 7/1/25, to further assess pharyngeal stage of swallow, prior to returning to SNF. 4. General aspiration and reflux precautions. Discussed with son and RN.      SWALLOW EVALUATION (Last 72 Hours)       SLP Adult Swallow Evaluation       Row Name 06/30/25 0945                   Rehab Evaluation    Document Type evaluation  -MD        Subjective Information no complaints  -MD        Patient " Observations alert;cooperative  -MD        Patient/Family/Caregiver Comments/Observations Son at bedside  -MD        Patient Effort adequate  -MD        Symptoms Noted During/After Treatment none  -MD           General Information    Patient Profile Reviewed yes  -MD        Pertinent History Of Current Problem Acute hypoxic resp failure, community acquired pna, acute exacerbation of COPD, iron deficiency anemia, GERD.  -MD        Current Method of Nutrition regular textures;thin liquids  -MD        Precautions/Limitations, Vision WFL;for purposes of eval  -MD        Precautions/Limitations, Hearing WFL;for purposes of eval  -MD        Prior Level of Function-Communication cognitive-linguistic impairment  -MD        Prior Level of Function-Swallowing no diet consistency restrictions  -MD        Plans/Goals Discussed with patient and family;agreed upon;other (see comments)  RN  -MD        Barriers to Rehab cognitive status;medically complex  -MD        Patient's Goals for Discharge patient did not state  -MD        Family Goals for Discharge other (see comments)  Get rid of pneumonia  -MD           Pain    Pretreatment Pain Rating 0/10 - no pain  -MD        Posttreatment Pain Rating 0/10 - no pain  -MD           Oral Motor Structure and Function    Dentition Assessment natural, present and adequate  -MD        Secretion Management WNL/WFL  -MD        Mucosal Quality moist, healthy  -MD           Oral Musculature and Cranial Nerve Assessment    Oral Motor General Assessment generalized oral motor weakness  -MD           General Eating/Swallowing Observations    Respiratory Support Currently in Use nasal cannula  -MD        O2 Liters other (see comments)  1.5L  -MD        Eating/Swallowing Skills fed by SLP  -MD        Positioning During Eating upright in bed  -MD        Utensils Used spoon;cup;straw  -MD        Consistencies Trialed regular textures;thin liquids;ice chips;pureed;nectar/syrup-thick liquids  -MD         "Pre SpO2 (%) 96  -MD        Post SpO2 (%) 94  -MD           Respiratory    Respiratory Status WFL  -MD           Clinical Swallow Eval    Oral Prep Phase WFL  -MD        Oral Transit WFL  -MD        Oral Residue WFL  -MD        Pharyngeal Phase suspected pharyngeal impairment  -MD        Esophageal Phase unremarkable  -MD        Clinical Swallow Evaluation Summary Bedside eval of swallow completed, with son at bedside. Son reports patient \"has been getting stranged on her drinks for the last month.\" Has been on a regular/thin diet. SLP sat patient's bed in an upright position, prior to any PO trials, and son stated, \"it seems harder for her to breathe when she's sitting up so she usually stays laying back.\" SLP educated son on patient needing to be upright for all PO intake, due to a reclined position opening airway and increasing risk for aspiration. He stated, \"no one has ever told me that but it makes sense.\" Oral mech completed, with patient's natural teeth present and adequate for chewing. Generalized oral motor weakness. PO trials of thin, puree, regular, and nectar thick liquids. Throat clears and coughing observed with trials of thin. No overt ss aspiration with NTL via straw, spoon, or trial of regular. However, patient is at risk for a decline in swallowing, throughout a meal of regular, because of likely development of increased fatigue from chewing.  Recommend: Mechanical soft, with ground meat, and nectar thick liquids. 2. meds crushed in puree, as tolerated. 3. Because of son reporting progressive difficulty with thin liquids and new dx of pna, patient would benefit from a modified barium swallow study 7/1/25, to further assess pharyngeal stage of swallow, prior to returning to SNF. 4. General aspiration and reflux precautions. Discussed with son and RN.  -MD           Pharyngeal Phase Concerns    Pharyngeal Phase Concerns cough;throat clear  -MD        Cough thin  -MD        Throat Clear thin  -MD     "       SLP Evaluation Clinical Impression    SLP Swallowing Diagnosis suspected pharyngeal dysphagia  -MD        Functional Impact risk of aspiration/pneumonia;risk of malnutrition;risk of dehydration  -MD        Rehab Potential/Prognosis, Swallowing adequate, monitor progress closely  -MD        Swallow Criteria for Skilled Therapeutic Interventions Met demonstrates skilled criteria  -MD           Recommendations    Therapy Frequency (Swallow) PRN  -MD        Predicted Duration Therapy Intervention (Days) until discharge  -MD        SLP Diet Recommendation soft to chew textures;ground;nectar thick liquids  -MD        Recommended Diagnostics VFSS (MBS);other (see comments)  7/1/25  -MD        Recommended Precautions and Strategies upright posture during/after eating;small bites of food and sips of liquid;check mouth frequently for oral residue/pocketing;general aspiration precautions;reflux precautions;fatigue precautions;assist with feeding  -MD        Oral Care Recommendations Oral Care BID/PRN;Suction toothbrush  -MD        SLP Rec. for Method of Medication Administration meds crushed;with puree;as tolerated  -MD        Monitor for Signs of Aspiration yes;notify SLP if any concerns  -MD        Anticipated Discharge Disposition (SLP) skilled nursing facility  -MD                  User Key  (r) = Recorded By, (t) = Taken By, (c) = Cosigned By      Initials Name Effective Dates    Marycarmen Gill, MA,CCC-SLP 06/05/25 -                     EDUCATION  The patient has been educated in the following areas:   Dysphagia (Swallowing Impairment) Oral Care/Hydration Modified Diet Instruction.                Time Calculation:    Time Calculation- SLP       Row Name 06/30/25 0300             Time Calculation- SLP    SLP Start Time 0945  -MD      SLP Received On 06/30/25  -MD         Untimed Charges    SLP Eval/Re-eval  ST Eval Oral Pharyng Swallow - 25948  -MD                User Key  (r) = Recorded By, (t) = Taken By, (c) =  Cosigned By      Initials Name Provider Type    Marycarmen Gill MA,CCC-SLP Speech and Language Pathologist                    Therapy Charges for Today       Code Description Service Date Service Provider Modifiers Qty    [id number]  ST EVAL ORAL PHARYNG SWALLOW 4 6/30/2025 Marycarmen Munoz MA,CCC-SLP GN 1                 Marycarmen Munoz MA,CCC-SLP  6/30/2025

## 2025-06-30 NOTE — CASE MANAGEMENT/SOCIAL WORK
Case Management/Social Work    Patient Name:  Kristine Hahn  YOB: 1935  MRN: [id number]  Admit Date:  6/28/2025        09:39 EDT   Discharge Plan       Row Name 06/30/25 0938       Plan    Plan Return to [facility] at discharge.                        Electronically signed by:  George Cain RN  06/30/25 09:39 EDT

## 2025-06-30 NOTE — THERAPY DISCHARGE NOTE
PT called [facility] who reports that pt is a avelina lift at baseline and is unable to transfer. Pt is at her baseline at this time, and has no needs for skilled PT at this time. PT to sign off.

## 2025-06-30 NOTE — PROGRESS NOTES
"Patient Name: Kristine Hahn  YOB: 1935  MRN: [id number]  Admission date: 6/28/2025  Reason for Encounter: MST 2-3 or Nursing Admission Screen    [facility] Clinical Nutrition Assessment     Subjective    Subjective Information     6/30: Pt w/ MST score of 2 - unsure of recent weight loss per chart review. Patient requiring soft to chew diet w/ NTL and requires feeding assistance. PO intake averaging 33% x 3 meals.      Assessment    H&P and Current Problems      H&P  Past Medical History:   Diagnosis Date    Anemia     Arthritis     Cancer     GERD (gastroesophageal reflux disease)     Glaucoma       History reviewed. No pertinent surgical history.   Current Problems   Admission Diagnosis:  Pneumonia [J18.9]    Problem List:    Pneumonia      Other Applicable Nutrition Information:        Anthropometrics      BMI, Height, Weight Estimated body mass index is 22.78 kg/m² as calculated from the following:    Height as of this encounter: 157.5 cm (62\").    Weight as of this encounter: 56.5 kg (124 lb 9 oz).    Weight Method: Bed scale       Trending Weight Changes No significant changes       Weight History  Wt Readings from Last 20 Encounters:   06/30/25 0537 56.5 kg (124 lb 9 oz)   06/29/25 0325 56.9 kg (125 lb 7.1 oz)   06/28/25 1918 59.5 kg (131 lb 2.8 oz)   06/28/25 1446 43.6 kg (96 lb 1.9 oz)   08/14/13 0952 43.6 kg (96 lb 3.3 oz)   05/07/13 1259 46.3 kg (102 lb 0.1 oz)        Labs      Comment: Labs reviewed     Results from last 7 days   Lab Units 06/29/25  0748 06/28/25  1452   SODIUM mmol/L 139 135*   POTASSIUM mmol/L 4.1 3.9   GLUCOSE mg/dL 123* 103*   BUN mg/dL 24.0* 21.0   CREATININE mg/dL 0.46* 0.53*   CALCIUM mg/dL 9.5 8.8   MAGNESIUM mg/dL  --  2.0   ALBUMIN g/dL  --  3.6   LACTATE mmol/L  --  0.8   BILIRUBIN mg/dL  --  0.7   ALK PHOS U/L  --  89   AST (SGOT) U/L  --  36*   ALT (SGPT) U/L  --  15   PROBNP pg/mL  --  1,505.0     Results from last 7 days   Lab Units 06/29/25  0748 " "06/28/25  1452   PLATELETS 10*3/mm3 359 311   HEMOGLOBIN g/dL 12.5 11.9*   HEMATOCRIT % 36.9 35.0     No results found for: \"HGBA1C\"       Medications       Scheduled Medications ampicillin-sulbactam, 3 g, Intravenous, Q6H  busPIRone, 10 mg, Oral, TID  Divalproex Sodium, 125 mg, Oral, Q12H  doxycycline, 100 mg, Intravenous, Q12H  sodium chloride, 10 mL, Intravenous, Q12H        Infusions      PRN Medications   acetaminophen **OR** acetaminophen **OR** acetaminophen    senna-docusate sodium **AND** polyethylene glycol **AND** bisacodyl **AND** bisacodyl    Calcium Replacement - Follow Nurse / BPA Driven Protocol    LORazepam    Magnesium Cardiology Dose Replacement - Follow Nurse / BPA Driven Protocol    nitroglycerin    ondansetron    Phosphorus Replacement - Follow Nurse / BPA Driven Protocol    Potassium Replacement - Follow Nurse / BPA Driven Protocol    QUEtiapine    sodium chloride    sodium chloride    sodium chloride     Physical Findings      Chewing/Swallowing  Teeth Status: Mouth/Teeth WDL: .WDL except, teeth Teeth Symptoms: tooth/teeth missing  Chewing/Swallowing Issues: Dysphagia   Edema  Generalized Edema: 1+ (Trace)                         Bowel Function  Stool Output  Stool Unmeasured Occurrence: 1 (06/30/25 0537)  Bowel Incontinence: Yes (06/30/25 0537)  Stool Amount: Medium (06/30/25 0537)  Stool Consistency: formed (06/29/25 0500)  Perineal Care: absorbent brief/pad changed, perineum cleansed (06/30/25 0537)  Last Bowel Movement: 06/28/25   I/Os  Intake & Output (last 3 days)         06/27 0701  06/28 0700 06/28 0701  06/29 0700 06/29 0701  06/30 0700 06/30 0701  07/01 0700    P.O.  120 290 240    IV Piggyback  100      Total Intake(mL/kg)  220 (3.9) 290 (5.1) 240 (4.2)    Net  +220 +290 +240            Urine Unmeasured Occurrence  1 x 4 x     Stool Unmeasured Occurrence  1 x 1 x            Lines, Drains, Airways, & Wounds       Active LDAs       Name Placement date Placement time Site Days Last " dressing change    Peripheral IV 06/29/25 0448 20 G Anterior;Left Forearm 06/29/25  0448  Forearm  1     Wound 03/05/24 Left sacral spine Pressure Injury 03/05/24  --  -- 482     Wound 03/05/24 Right sacral spine Pressure Injury 03/05/24  --  -- 482                       Nutrition Focused Physical Exam     Trending NFPE      Malnutrition Severity Assessment            (1)   Current Nutrition Orders & Evaluation of Intake      Oral Nutrition     Food Allergies  and Intolerances NKFA   Current PO Diet Diet: Cardiac, Diabetic; Healthy Heart (2-3 Na+); Consistent Carbohydrate; Feeding Assistance - Nursing, No Mixed Consistencies; Texture: Soft to Chew (NDD 3); Soft to Chew: Ground Meat; Fluid Consistency: Emerald Thick   Oral Nutrition Supplement None     Trending % PO Intake 6/30: 33% x 3 meals    (2)  Assessment & Plan   Nutrition Diagnosis and Goals       Nutrition Diagnosis 1 Difficulty Chewing  r/t pharyngeal dysphagia as evidenced by need for soft to chew diet w/ NTL      Nutrition Diagnosis 2         Goal(s) Establish PO Intake, Increase PO Intake , Maintain PO Intake , Meets Estimated Needs , Accepts Oral Nutrition Supplement, Nutrition Related Labs Improve , Maintain Weight, and No Significant Weight Loss     Nutrition Intervention and Prescription       Intervention  Start oral nutrition supplement, Continue to monitor for plan of care, and Continue with current interventions      Diet Prescription CCD/HH/soft to chew w/NTL     Supplement Prescription Power pudding BID    Education Provided     (3)  Monitoring/Evaluation       Monitor/Evaluation Per Protocol, PO Intake, Oral Nutrition Supplement Intake, Pertinent Labs, Weight, Skin Status, GI Status, Symptoms, POC/GOC, and Swallow Function     RD Follow-Up Encounter 3-5 days     Electronically signed by:  Daniel Lala RD  06/30/25 12:13 EDT

## 2025-06-30 NOTE — H&P
[facility] HOSPITALIST    PROGRESS NOTE    Name:  Kristine Hahn   Age:  89 y.o.  Sex:  female  :  1935  MRN:  [id number]   Visit Number:  [id number]  Admission Date:  2025  Date Of Service:  25  Primary Care Physician:  Felton Choe MD     LOS: 2 days :    Chief Complaint:      Dyspnea    Subjective:    Patient seen and examined.  Oxygenating much improved.  Baseline 2 L nasal cannula.  Persistent but improved wheezing and rhonchi.  Patient likely be discharged in 1-2 days.    Hospital Course:    Patient is an 89-year-old female transferred by ambulance from nursing home due to concern for confusion and shortness of breath. Patient is requiring 3 L nasal cannula which represents a new oxygen requirement as she typically does not require O2.  Patient states she has been short of breath over the last 24 hours, has been progressively worsening.  Denies any chest pain or any other concerns.  Patient denies any recent sick contacts, travel history, or medication changes.     In the emergency department, Labs show mild elevations in white count and procalcitonin otherwise largely unremarkable. CT PE study shows left lower lobe and right middle lobe infiltrates concerning for pneumonia.  Patient was started patient on Rocephin and doxycycline.  Hospitalist services consulted for admission for acute hypoxic respiratory failure secondary to pneumonia.    Review of Systems:     All systems were reviewed and negative except as mentioned in subjective, assessment and plan.    Vital Signs:    Temp:  [97.5 °F (36.4 °C)-98.8 °F (37.1 °C)] 97.5 °F (36.4 °C)  Heart Rate:  [66-99] 66  Resp:  [14-18] 16  BP: (132-191)/(58-95) 151/95    Intake and output:    I/O last 3 completed shifts:  In: 410 [P.O.:410]  Out: -   I/O this shift:  In: 240 [P.O.:240]  Out: -     Physical Examination:    General Appearance:  Alert and cooperative.  Chronically ill-appearing.   Head:  Atraumatic and  "normocephalic.   Eyes: Conjunctivae and sclerae normal, no icterus. No pallor.   Throat: No oral lesions, no thrush, oral mucosa moist.   Neck: Supple, trachea midline, no thyromegaly.   Lungs:   Scattered wheezing and rhonchi, improved versus yesterday.. No Pleural rub or bronchial breathing.   Heart:  Normal S1 and S2, no murmur, no gallop, no rub. No JVD.   Abdomen:   Normal bowel sounds, no masses, no organomegaly. Soft, nontender, nondistended, no rebound tenderness.   Extremities: Supple, no edema, no cyanosis, no clubbing.   Skin: No bleeding or rash.   Neurologic: Alert and oriented x 3. No facial asymmetry. Moves all four limbs. No tremors.      Laboratory results:    Results from last 7 days   Lab Units 06/29/25  0748 06/28/25  1452   SODIUM mmol/L 139 135*   POTASSIUM mmol/L 4.1 3.9   CHLORIDE mmol/L 100 98   CO2 mmol/L 25.3 26.6   BUN mg/dL 24.0* 21.0   CREATININE mg/dL 0.46* 0.53*   CALCIUM mg/dL 9.5 8.8   BILIRUBIN mg/dL  --  0.7   ALK PHOS U/L  --  89   ALT (SGPT) U/L  --  15   AST (SGOT) U/L  --  36*   GLUCOSE mg/dL 123* 103*     Results from last 7 days   Lab Units 06/29/25  0748 06/28/25  1452   WBC 10*3/mm3 11.37* 11.88*   HEMOGLOBIN g/dL 12.5 11.9*   HEMATOCRIT % 36.9 35.0   PLATELETS 10*3/mm3 359 311         Results from last 7 days   Lab Units 06/28/25  1625 06/28/25  1452   HSTROP T ng/L 19* 20*     Results from last 7 days   Lab Units 06/28/25  1625 06/28/25  1620   BLOODCX  No growth at 24 hours No growth at 24 hours     No results for input(s): \"PHART\", \"VCT9JXG\", \"PO2ART\", \"DKY1MTM\", \"BASEEXCESS\" in the last 8760 hours.   I have reviewed the patient's laboratory results.    Radiology results:    CT Abdomen Pelvis With Contrast  Result Date: 6/28/2025  FINAL REPORT TECHNIQUE: null CLINICAL HISTORY: RUQ/RLQ pain COMPARISON: null FINDINGS: CT abdomen and pelvis with contrast Comparison: None provided Findings: Findings at the lung bases are reported separately. Unremarkable gallbladder and " solid organs. No urolithiasis. No bowel obstruction, pneumoperitoneum, or pneumatosis. Small bilateral femoral hernias containing fat. Poorly distended urinary bladder. Otherwise unremarkable pelvic contents. The appendix is not definitively seen. There are no secondary findings to suggest appendicitis. The bones are intact. Chronic deformity of the left femoral head. Advanced arthritic changes of the left hip.     Impression: IMPRESSION: No acute abdominal disease. Authenticated and Electronically Signed by Lili Hughes MD on 06/28/2025 06:10:48 PM    CT Angiogram Chest Pulmonary Embolism  Result Date: 6/28/2025  FINAL REPORT TECHNIQUE: null CLINICAL HISTORY: Pulmonary Embolism COMPARISON: null FINDINGS: CT angiography chest with contrast. 3D Postprocessing. Comparison: None provided Findings: The heart is mildly enlarged. The thoracic aorta is normal caliber. No pulmonary artery filling defects. 1.3 cm nodule within the left lobe of the thyroid gland. Multiple mildly enlarged lymph nodes within the mediastinum. Lymph nodes measure up to 12 mm in short axis dimension. There is mild generalized tracheal narrowing. There are small bilateral pleural effusions, left much larger than right. Adjacent compressive atelectasis within the left lower lobe. Small focus of consolidation and adjacent ground-glass opacity within the medial aspect of the left upper lobe. Consolidation and volume loss involving a portion of the right middle lobe. Bandlike atelectasis within the posterior aspect of the right upper lobe. Bronchiolar inflammation and nodularity within the left lower lobe. The visualized upper abdomen is unremarkable. No acute fractures.     Impression: IMPRESSION: 1. No evidence of pulmonary artery embolism. 2. There are foci of infiltrate within the left upper and lower lobes. Consolidation and volume loss is also present involving a portion of the right middle lobe. This may be secondary to atelectasis and/or  infiltrates. 3. There is a mild degree of mediastinal lymphadenopathy. 4. Small bilateral pleural effusions, left larger than right. Authenticated and Electronically Signed by Lili Hughes MD on 06/28/2025 06:08:20 PM    CT Head Without Contrast  Result Date: 6/28/2025  HEAD CT     6/28/2025 3:38 PM  HISTORY: AMS, hypoxia Altered mental status.  TECHNIQUE: Multiple axial CT images were performed from the foramen magnum to the vertex. Coronal reformatted images were reconstructed from axial data set. This study was performed with techniques to keep radiation doses as low as reasonably achievable (ALARA). Individualized dose reduction techniques using automated exposure control or adjustment of mA and/or kV according to the patient size were employed. 526.08 mGy.cm  COMPARISON: None  FINDINGS: Motion artifact. No acute intracranial hemorrhage or large acute cortical infarct. Chronic small vessel ischemic white matter changes and generalized cerebral volume loss are present. Ventricles are enlarged including bilateral lateral ventricles, third and fourth ventricle.. No midline shift. The basal cisterns are patent. Intracranial arterial atherosclerotic calcifications. Small calcified extra-axial mass along the right frontal convexity, measuring 10 mm, likely presenting a meningioma. Bilateral globe lens surgery.  No skull fracture. Chronic bilateral maxillary sinus mucoperiosteal thickening. Moderate opacification of the right anterior and posterior ethmoidal air cells. The renal visualized paranasal sinuses and mastoid air cells are clear.      Impression: No acute intracranial abnormality. Chronic microvascular ischemic white matter changes with global volume loss and moderate ventriculomegaly. Other findings as above   CRITICAL RESULT: No.  COMMUNICATION: Per this written report.  Images personally reviewed, interpreted, and dictated by JOSS Whipple.            This report was signed and finalized on  6/28/2025 3:59 PM by JOSS Whipple.      XR Chest 1 View  Result Date: 6/28/2025  CLINICAL INDICATION:  Weak/Dizzy/AMS triage protocol  EXAMINATION TECHNIQUE: XR CHEST 1 VW-  COMPARISON: None.  FINDINGS: Mild cardiomegaly. Aortic atherosclerosis and tortuosity. Perihilar vascular engorgement. No overt edema. No pneumothorax. No large pleural effusion. Right midlung and medial left lower lung zone patchy opacities. Moderate degenerative changes of the spine. Moderate degenerative changes of the bilateral shoulder joints. No free air under the hemidiaphragms.      Impression: Mild cardiomegaly. Right midlung and medial left lower lung zone patchy opacities, atelectasis or pneumonia.  Images personally reviewed, interpreted and dictated by JOSS Whipple.     This report was signed and finalized on 6/28/2025 3:14 PM by JOSS Whipple.      I have reviewed the patient's radiology reports.    Medication Review:     I have reviewed the patient's active and prn medications.     Problem List:      Pneumonia      Assessment:    Acute hypoxic respiratory failure  Community-acquired pneumonia  Acute exacerbation of COPD  Iron deficiency anemia  GERD    Plan:    Acute hypoxic respiratory failure  Community-acquired pneumonia  Acute exacerbation of COPD  Patient has new oxygen requirements, 6 L nasal cannula.  Back to baseline at this time.  Persistently wheezing, close to discharge.  Evidence of pneumonia on CT scan.  IV Unasyn, IV doxycycline.  DuoNebs.  Mucinex  Iron deficiency anemia  GERD  Resume home medications as appropriate.    DVT Prophylaxis: SCDs  Code Status: Full code  Diet: Consistent carb, cardiac  Discharge Plan: Return to nursing home in 1 to 2 days.    Mike Jang DO  06/30/25  14:10 EDT    Dictated utilizing Dragon dictation.

## 2025-06-30 NOTE — CASE MANAGEMENT/SOCIAL WORK
Case Management/Social Work    Patient Name:  Kristine Hahn  YOB: 1935  MRN: [id number]  Admit Date:  6/28/2025      Sw spoke with [facility]. [facility] has a bed hold and can return to the facility when medically ready. Team updated.       Electronically signed by:  NASIR Madden  06/30/25 15:29 EDT

## 2025-07-01 ENCOUNTER — APPOINTMENT (OUTPATIENT)
Dept: GENERAL RADIOLOGY | Facility: HOSPITAL | Age: OVER 89
End: 2025-07-01
Payer: MEDICARE

## 2025-07-01 PROCEDURE — 25010000002 AMPICILLIN-SULBACTAM PER 1.5 G: Performed by: FAMILY MEDICINE

## 2025-07-01 PROCEDURE — 99232 SBSQ HOSP IP/OBS MODERATE 35: CPT | Performed by: NURSE PRACTITIONER

## 2025-07-01 RX ORDER — QUETIAPINE FUMARATE 25 MG/1
25 TABLET, FILM COATED ORAL NIGHTLY
Status: DISCONTINUED | OUTPATIENT
Start: 2025-07-01 | End: 2025-07-01

## 2025-07-01 RX ORDER — QUETIAPINE FUMARATE 25 MG/1
12.5 TABLET, FILM COATED ORAL NIGHTLY
Status: DISCONTINUED | OUTPATIENT
Start: 2025-07-01 | End: 2025-07-03 | Stop reason: HOSPADM

## 2025-07-01 RX ORDER — IPRATROPIUM BROMIDE AND ALBUTEROL SULFATE 2.5; .5 MG/3ML; MG/3ML
3 SOLUTION RESPIRATORY (INHALATION) EVERY 4 HOURS PRN
Status: DISCONTINUED | OUTPATIENT
Start: 2025-07-01 | End: 2025-07-03 | Stop reason: HOSPADM

## 2025-07-01 RX ADMIN — LORAZEPAM 0.5 MG: 0.5 TABLET ORAL at 00:50

## 2025-07-01 RX ADMIN — QUETIAPINE FUMARATE 12.5 MG: 25 TABLET ORAL at 20:41

## 2025-07-01 RX ADMIN — ASPIRIN 81 MG CHEWABLE TABLET 81 MG: 81 TABLET CHEWABLE at 10:00

## 2025-07-01 RX ADMIN — AMPICILLIN SODIUM AND SULBACTAM SODIUM 3 G: 2; 1 INJECTION, POWDER, FOR SOLUTION INTRAMUSCULAR; INTRAVENOUS at 15:34

## 2025-07-01 RX ADMIN — BUSPIRONE HYDROCHLORIDE 10 MG: 10 TABLET ORAL at 15:34

## 2025-07-01 RX ADMIN — AMPICILLIN SODIUM AND SULBACTAM SODIUM 3 G: 2; 1 INJECTION, POWDER, FOR SOLUTION INTRAMUSCULAR; INTRAVENOUS at 20:16

## 2025-07-01 RX ADMIN — DIVALPROEX SODIUM 125 MG: 125 CAPSULE, COATED PELLETS ORAL at 20:41

## 2025-07-01 RX ADMIN — VERAPAMIL HYDROCHLORIDE 80 MG: 40 TABLET ORAL at 20:41

## 2025-07-01 RX ADMIN — Medication 10 ML: at 20:16

## 2025-07-01 RX ADMIN — QUETIAPINE FUMARATE 25 MG: 25 TABLET ORAL at 00:50

## 2025-07-01 RX ADMIN — FERROUS SULFATE TAB EC 324 MG (65 MG FE EQUIVALENT) 324 MG: 324 (65 FE) TABLET DELAYED RESPONSE at 10:00

## 2025-07-01 RX ADMIN — Medication 10 ML: at 12:57

## 2025-07-01 RX ADMIN — DIVALPROEX SODIUM 125 MG: 125 CAPSULE, COATED PELLETS ORAL at 10:00

## 2025-07-01 RX ADMIN — OXYBUTYNIN CHLORIDE 5 MG: 5 TABLET, EXTENDED RELEASE ORAL at 10:00

## 2025-07-01 RX ADMIN — BUSPIRONE HYDROCHLORIDE 10 MG: 10 TABLET ORAL at 10:00

## 2025-07-01 RX ADMIN — VERAPAMIL HYDROCHLORIDE 80 MG: 40 TABLET ORAL at 10:00

## 2025-07-01 RX ADMIN — Medication 1000 UNITS: at 10:00

## 2025-07-01 RX ADMIN — BUSPIRONE HYDROCHLORIDE 10 MG: 10 TABLET ORAL at 20:41

## 2025-07-01 RX ADMIN — Medication 10 ML: at 00:50

## 2025-07-01 RX ADMIN — Medication 10 ML: at 02:58

## 2025-07-01 RX ADMIN — SODIUM CHLORIDE 40 ML: 9 INJECTION, SOLUTION INTRAVENOUS at 02:58

## 2025-07-01 RX ADMIN — FAMOTIDINE 20 MG: 20 TABLET, FILM COATED ORAL at 10:01

## 2025-07-01 RX ADMIN — AMPICILLIN SODIUM AND SULBACTAM SODIUM 3 G: 2; 1 INJECTION, POWDER, FOR SOLUTION INTRAMUSCULAR; INTRAVENOUS at 09:59

## 2025-07-01 RX ADMIN — AMPICILLIN SODIUM AND SULBACTAM SODIUM 3 G: 2; 1 INJECTION, POWDER, FOR SOLUTION INTRAMUSCULAR; INTRAVENOUS at 02:58

## 2025-07-01 NOTE — PROGRESS NOTES
[facility] HOSPITALIST    PROGRESS NOTE    Name:  Kristine Hahn   Age:  89 y.o.  Sex:  female  :  1935  MRN:  [id number]   Visit Number:  [id number]  Admission Date:  2025  Date Of Service:  25  Primary Care Physician:  Felton Choe MD     LOS: 3 days :    Chief Complaint:      Dyspnea    Subjective:    Patient seen and examined.  She is somnolent.  Unable to answer any of my questions.  Otherwise maintaining appropriate saturations on 2 L nasal cannula.    Hospital Course:    Patient is an 89-year-old female transferred by ambulance from nursing home due to concern for confusion and shortness of breath.  Pertinent past medical history included COPD, iron deficiency anemia, GERD, and dementia.  Patient is requiring 3 L nasal cannula which represents a new oxygen requirement, as she typically does not require O2.  Patient states she has been short of breath over the last 24 hours, has been progressively worsening.  Denies any chest pain or any other concerns.     In the emergency department, Labs show mild elevations in white count and procalcitonin otherwise largely unremarkable. CT PE study shows left lower lobe and right middle lobe infiltrates concerning for pneumonia.  Patient was started patient on Rocephin and doxycycline.  Hospitalist services consulted for admission for acute hypoxic respiratory failure secondary to pneumonia.    Review of Systems:     All systems were reviewed and negative except as mentioned in subjective, assessment and plan.    Vital Signs:    Temp:  [98 °F (36.7 °C)-98.8 °F (37.1 °C)] 98.3 °F (36.8 °C)  Heart Rate:  [65-82] 68  Resp:  [16-18] 16  BP: (103-170)/(51-79) 133/60    Intake and output:    I/O last 3 completed shifts:  In: 480 [P.O.:480]  Out: -   No intake/output data recorded.    Physical Examination:    General Appearance:  Lethargic.   Head:  Atraumatic and normocephalic.   Eyes: Conjunctivae and sclerae normal, no icterus. No  "pallor.   Throat: No oral lesions, no thrush, oral mucosa moist.   Neck: Supple, trachea midline, no thyromegaly.   Lungs:   Breath sounds heard bilaterally equally.  No wheezing or crackles. No Pleural rub or bronchial breathing.  On 2 L unlabored.   Heart:  Normal S1 and S2, no murmur, no gallop, no rub. No JVD.   Abdomen:   Normal bowel sounds, no masses, no organomegaly. Soft, nontender, nondistended, no rebound tenderness.   Extremities: Supple, no edema, no cyanosis, no clubbing.   Skin: No bleeding or rash.   Neurologic: Lethargic. No facial asymmetry. Moves all four limbs. No tremors. Generalized weakness.     Laboratory results:    Results from last 7 days   Lab Units 06/29/25  0748 06/28/25  1452   SODIUM mmol/L 139 135*   POTASSIUM mmol/L 4.1 3.9   CHLORIDE mmol/L 100 98   CO2 mmol/L 25.3 26.6   BUN mg/dL 24.0* 21.0   CREATININE mg/dL 0.46* 0.53*   CALCIUM mg/dL 9.5 8.8   BILIRUBIN mg/dL  --  0.7   ALK PHOS U/L  --  89   ALT (SGPT) U/L  --  15   AST (SGOT) U/L  --  36*   GLUCOSE mg/dL 123* 103*     Results from last 7 days   Lab Units 06/29/25  0748 06/28/25  1452   WBC 10*3/mm3 11.37* 11.88*   HEMOGLOBIN g/dL 12.5 11.9*   HEMATOCRIT % 36.9 35.0   PLATELETS 10*3/mm3 359 311         Results from last 7 days   Lab Units 06/28/25  1625 06/28/25  1452   HSTROP T ng/L 19* 20*     Results from last 7 days   Lab Units 06/28/25  1625 06/28/25  1620   BLOODCX  No growth at 2 days No growth at 2 days     No results for input(s): \"PHART\", \"HLK7SDV\", \"PO2ART\", \"XRP9WQM\", \"BASEEXCESS\" in the last 8760 hours.   I have reviewed the patient's laboratory results.    Radiology results:    No radiology results from the last 24 hrs  I have reviewed the patient's radiology reports.    Medication Review:     I have reviewed the patient's active and prn medications.     Problem List:      Pneumonia      Assessment:    Acute hypoxic respiratory failure  Community-acquired pneumonia  Chronic COPD  Dementia  Iron deficiency " anemia  GERD  Seizure disorder    Plan:    Acute hypoxic respiratory failure  Community-acquired pneumonia  Chronic COPD  Patient has new oxygen requirements of 2 liters.  Evidence of pneumonia on CT scan with elevation of procalcitonin.  IV Unasyn, IV doxycycline.  DuoNebs.  Mucinex  Patient somnolent, was given Seroquel at midnight.  Will hold sedating medications and schedule Seroquel nightly due to underlying dementia.  Aspiration could be contributing to pneumonia.  Seen by SLP who suggested modified barium swallow study.  Patient too lethargic today to participate.  Will continue to monitor closely.  Iron deficiency anemia  GERD  Resume home medications as appropriate.    Attempted to call son George with no answer.    I have reviewed the copied text and it is accurate as of 7/1/2025      DVT Prophylaxis: SCDs  Code Status: Full code  Diet: Consistent carb, cardiac  Discharge Plan: Return to nursing home in 1 to 2 days.    Arielle Dukes, APRN  07/01/25  12:09 EDT    Dictated utilizing Dragon dictation.

## 2025-07-01 NOTE — MBS/VFSS/FEES
At noon, pt. still unable to participate with MBSS due to insufficient alertness.  Pt. had seroquel at midnight and has been difficult to rouse for participation with exam.  Will plan for tomorrow if possible.

## 2025-07-01 NOTE — PLAN OF CARE
Goal Outcome Evaluation:  Plan of Care Reviewed With: patient        Progress: improving  Outcome Evaluation: VSS, no acute episodes of note ATT, will continue to follow plan of care

## 2025-07-01 NOTE — PLAN OF CARE
Problem: Adult Inpatient Plan of Care  Goal: Absence of Hospital-Acquired Illness or Injury  Intervention: Prevent Infection  Recent Flowsheet Documentation  Taken 7/1/2025 1836 by Corrina Calhoun RN  Infection Prevention: environmental surveillance performed  Taken 7/1/2025 1600 by Corrina Calhoun RN  Infection Prevention: environmental surveillance performed  Taken 7/1/2025 1405 by Corrina Calhoun RN  Infection Prevention: environmental surveillance performed  Taken 7/1/2025 1234 by Corrina Calhoun RN  Infection Prevention: rest/sleep promoted  Taken 7/1/2025 1011 by Corrina Calhoun RN  Infection Prevention: environmental surveillance performed  Taken 7/1/2025 0831 by Corrina Calhoun RN  Infection Prevention:   environmental surveillance performed   rest/sleep promoted   single patient room provided     Problem: Adult Inpatient Plan of Care  Goal: Absence of Hospital-Acquired Illness or Injury  Intervention: Prevent and Manage VTE (Venous Thromboembolism) Risk  Recent Flowsheet Documentation  Taken 7/1/2025 0831 by Corrina Calhoun RN  VTE Prevention/Management:   SCDs (sequential compression devices) off   patient refused intervention     Problem: Adult Inpatient Plan of Care  Goal: Absence of Hospital-Acquired Illness or Injury  Intervention: Identify and Manage Fall Risk  Recent Flowsheet Documentation  Taken 7/1/2025 1836 by Corrina Calhoun RN  Safety Promotion/Fall Prevention:   safety round/check completed   room organization consistent  Taken 7/1/2025 1600 by Corrina Calhoun RN  Safety Promotion/Fall Prevention:   safety round/check completed   room organization consistent   fall prevention program maintained   clutter free environment maintained  Taken 7/1/2025 1405 by Corrina Calhoun RN  Safety Promotion/Fall Prevention:   safety round/check completed   room organization consistent   fall prevention program maintained   clutter free environment maintained  Taken 7/1/2025  1234 by Corrina Calhoun RN  Safety Promotion/Fall Prevention:   clutter free environment maintained   fall prevention program maintained   safety round/check completed   room organization consistent  Taken 7/1/2025 1011 by Corrina Calhoun RN  Safety Promotion/Fall Prevention:   safety round/check completed   room organization consistent   nonskid shoes/slippers when out of bed   fall prevention program maintained   clutter free environment maintained  Taken 7/1/2025 0831 by Corrina Calhoun RN  Safety Promotion/Fall Prevention:   fall prevention program maintained   room organization consistent   safety round/check completed     Problem: Adult Inpatient Plan of Care  Goal: Absence of Hospital-Acquired Illness or Injury  Intervention: Prevent Infection  Recent Flowsheet Documentation  Taken 7/1/2025 1836 by Corrina Calhoun RN  Infection Prevention: environmental surveillance performed  Taken 7/1/2025 1600 by Corrina Calhoun RN  Infection Prevention: environmental surveillance performed  Taken 7/1/2025 1405 by Corrina Calhoun RN  Infection Prevention: environmental surveillance performed  Taken 7/1/2025 1234 by Corrina Calhoun RN  Infection Prevention: rest/sleep promoted  Taken 7/1/2025 1011 by Corrina Calhoun RN  Infection Prevention: environmental surveillance performed  Taken 7/1/2025 0831 by Corrina Calhoun RN  Infection Prevention:   environmental surveillance performed   rest/sleep promoted   single patient room provided   Goal Outcome Evaluation:  Plan of Care Reviewed With: patient        Progress: improving  Outcome Evaluation: VSS, 2L NC. Oriented to self, requires reorientation.

## 2025-07-02 ENCOUNTER — APPOINTMENT (OUTPATIENT)
Dept: GENERAL RADIOLOGY | Facility: HOSPITAL | Age: OVER 89
End: 2025-07-02
Payer: MEDICARE

## 2025-07-02 LAB
ANION GAP SERPL CALCULATED.3IONS-SCNC: 15.7 MMOL/L (ref 5–15)
BASOPHILS # BLD AUTO: 0.06 10*3/MM3 (ref 0–0.2)
BASOPHILS NFR BLD AUTO: 0.5 % (ref 0–1.5)
BUN SERPL-MCNC: 11 MG/DL (ref 8–23)
BUN/CREAT SERPL: 29.7 (ref 7–25)
CALCIUM SPEC-SCNC: 8.4 MG/DL (ref 8.6–10.5)
CHLORIDE SERPL-SCNC: 97 MMOL/L (ref 98–107)
CO2 SERPL-SCNC: 29.3 MMOL/L (ref 22–29)
CREAT SERPL-MCNC: 0.37 MG/DL (ref 0.57–1)
DEPRECATED RDW RBC AUTO: 46.6 FL (ref 37–54)
EGFRCR SERPLBLD CKD-EPI 2021: 96.5 ML/MIN/1.73
EOSINOPHIL # BLD AUTO: 0.32 10*3/MM3 (ref 0–0.4)
EOSINOPHIL NFR BLD AUTO: 2.6 % (ref 0.3–6.2)
ERYTHROCYTE [DISTWIDTH] IN BLOOD BY AUTOMATED COUNT: 13.3 % (ref 12.3–15.4)
GLUCOSE SERPL-MCNC: 56 MG/DL (ref 65–99)
HCT VFR BLD AUTO: 36.3 % (ref 34–46.6)
HGB BLD-MCNC: 11.6 G/DL (ref 12–15.9)
IMM GRANULOCYTES # BLD AUTO: 0.18 10*3/MM3 (ref 0–0.05)
IMM GRANULOCYTES NFR BLD AUTO: 1.5 % (ref 0–0.5)
LYMPHOCYTES # BLD AUTO: 1.52 10*3/MM3 (ref 0.7–3.1)
LYMPHOCYTES NFR BLD AUTO: 12.4 % (ref 19.6–45.3)
MCH RBC QN AUTO: 30.3 PG (ref 26.6–33)
MCHC RBC AUTO-ENTMCNC: 32 G/DL (ref 31.5–35.7)
MCV RBC AUTO: 94.8 FL (ref 79–97)
MONOCYTES # BLD AUTO: 1.38 10*3/MM3 (ref 0.1–0.9)
MONOCYTES NFR BLD AUTO: 11.2 % (ref 5–12)
NEUTROPHILS NFR BLD AUTO: 71.8 % (ref 42.7–76)
NEUTROPHILS NFR BLD AUTO: 8.82 10*3/MM3 (ref 1.7–7)
NRBC BLD AUTO-RTO: 0 /100 WBC (ref 0–0.2)
PLATELET # BLD AUTO: 361 10*3/MM3 (ref 140–450)
PMV BLD AUTO: 9.2 FL (ref 6–12)
POTASSIUM SERPL-SCNC: 3.1 MMOL/L (ref 3.5–5.2)
POTASSIUM SERPL-SCNC: 3.4 MMOL/L (ref 3.5–5.2)
RBC # BLD AUTO: 3.83 10*6/MM3 (ref 3.77–5.28)
SODIUM SERPL-SCNC: 142 MMOL/L (ref 136–145)
WBC NRBC COR # BLD AUTO: 12.28 10*3/MM3 (ref 3.4–10.8)

## 2025-07-02 PROCEDURE — 25010000002 AMPICILLIN-SULBACTAM PER 1.5 G: Performed by: FAMILY MEDICINE

## 2025-07-02 PROCEDURE — 92611 MOTION FLUOROSCOPY/SWALLOW: CPT

## 2025-07-02 PROCEDURE — 85025 COMPLETE CBC W/AUTO DIFF WBC: CPT | Performed by: NURSE PRACTITIONER

## 2025-07-02 PROCEDURE — 74230 X-RAY XM SWLNG FUNCJ C+: CPT

## 2025-07-02 PROCEDURE — 99232 SBSQ HOSP IP/OBS MODERATE 35: CPT | Performed by: NURSE PRACTITIONER

## 2025-07-02 PROCEDURE — 84132 ASSAY OF SERUM POTASSIUM: CPT | Performed by: FAMILY MEDICINE

## 2025-07-02 PROCEDURE — 25810000003 SODIUM CHLORIDE 0.9 % SOLUTION: Performed by: NURSE PRACTITIONER

## 2025-07-02 PROCEDURE — 25010000002 POTASSIUM CHLORIDE 10 MEQ/100ML SOLUTION: Performed by: FAMILY MEDICINE

## 2025-07-02 PROCEDURE — 80048 BASIC METABOLIC PNL TOTAL CA: CPT | Performed by: NURSE PRACTITIONER

## 2025-07-02 RX ORDER — SODIUM CHLORIDE 9 MG/ML
100 INJECTION, SOLUTION INTRAVENOUS CONTINUOUS
Status: DISCONTINUED | OUTPATIENT
Start: 2025-07-02 | End: 2025-07-02

## 2025-07-02 RX ORDER — POTASSIUM CHLORIDE 7.45 MG/ML
10 INJECTION INTRAVENOUS
Status: DISPENSED | OUTPATIENT
Start: 2025-07-02 | End: 2025-07-02

## 2025-07-02 RX ORDER — POTASSIUM CHLORIDE 1500 MG/1
40 TABLET, EXTENDED RELEASE ORAL EVERY 4 HOURS
Status: COMPLETED | OUTPATIENT
Start: 2025-07-02 | End: 2025-07-03

## 2025-07-02 RX ORDER — SODIUM CHLORIDE 9 MG/ML
100 INJECTION, SOLUTION INTRAVENOUS CONTINUOUS
Status: ACTIVE | OUTPATIENT
Start: 2025-07-02 | End: 2025-07-02

## 2025-07-02 RX ADMIN — SODIUM CHLORIDE 100 ML/HR: 9 INJECTION, SOLUTION INTRAVENOUS at 07:43

## 2025-07-02 RX ADMIN — FAMOTIDINE 20 MG: 20 TABLET, FILM COATED ORAL at 09:38

## 2025-07-02 RX ADMIN — POTASSIUM CHLORIDE 10 MEQ: 7.45 INJECTION INTRAVENOUS at 09:37

## 2025-07-02 RX ADMIN — OXYBUTYNIN CHLORIDE 5 MG: 5 TABLET, EXTENDED RELEASE ORAL at 09:38

## 2025-07-02 RX ADMIN — BUSPIRONE HYDROCHLORIDE 10 MG: 10 TABLET ORAL at 17:10

## 2025-07-02 RX ADMIN — ASPIRIN 81 MG CHEWABLE TABLET 81 MG: 81 TABLET CHEWABLE at 09:37

## 2025-07-02 RX ADMIN — BUSPIRONE HYDROCHLORIDE 10 MG: 10 TABLET ORAL at 09:38

## 2025-07-02 RX ADMIN — QUETIAPINE FUMARATE 12.5 MG: 25 TABLET ORAL at 20:30

## 2025-07-02 RX ADMIN — FERROUS SULFATE TAB EC 324 MG (65 MG FE EQUIVALENT) 324 MG: 324 (65 FE) TABLET DELAYED RESPONSE at 09:38

## 2025-07-02 RX ADMIN — POTASSIUM CHLORIDE 10 MEQ: 7.45 INJECTION INTRAVENOUS at 12:54

## 2025-07-02 RX ADMIN — BUSPIRONE HYDROCHLORIDE 10 MG: 10 TABLET ORAL at 20:30

## 2025-07-02 RX ADMIN — Medication 10 ML: at 20:31

## 2025-07-02 RX ADMIN — POTASSIUM CHLORIDE 10 MEQ: 7.45 INJECTION INTRAVENOUS at 14:33

## 2025-07-02 RX ADMIN — VERAPAMIL HYDROCHLORIDE 80 MG: 40 TABLET ORAL at 09:37

## 2025-07-02 RX ADMIN — AMPICILLIN SODIUM AND SULBACTAM SODIUM 3 G: 2; 1 INJECTION, POWDER, FOR SOLUTION INTRAMUSCULAR; INTRAVENOUS at 20:15

## 2025-07-02 RX ADMIN — Medication 1000 UNITS: at 09:37

## 2025-07-02 RX ADMIN — AMPICILLIN SODIUM AND SULBACTAM SODIUM 3 G: 2; 1 INJECTION, POWDER, FOR SOLUTION INTRAMUSCULAR; INTRAVENOUS at 09:39

## 2025-07-02 RX ADMIN — POTASSIUM CHLORIDE 40 MEQ: 1500 TABLET, EXTENDED RELEASE ORAL at 22:19

## 2025-07-02 RX ADMIN — AMPICILLIN SODIUM AND SULBACTAM SODIUM 3 G: 2; 1 INJECTION, POWDER, FOR SOLUTION INTRAMUSCULAR; INTRAVENOUS at 01:58

## 2025-07-02 RX ADMIN — VERAPAMIL HYDROCHLORIDE 80 MG: 40 TABLET ORAL at 20:30

## 2025-07-02 RX ADMIN — DIVALPROEX SODIUM 125 MG: 125 CAPSULE, COATED PELLETS ORAL at 09:38

## 2025-07-02 RX ADMIN — AMPICILLIN SODIUM AND SULBACTAM SODIUM 3 G: 2; 1 INJECTION, POWDER, FOR SOLUTION INTRAMUSCULAR; INTRAVENOUS at 14:56

## 2025-07-02 RX ADMIN — POTASSIUM CHLORIDE 10 MEQ: 7.45 INJECTION INTRAVENOUS at 11:39

## 2025-07-02 RX ADMIN — POTASSIUM CHLORIDE 10 MEQ: 7.45 INJECTION INTRAVENOUS at 15:48

## 2025-07-02 RX ADMIN — DIVALPROEX SODIUM 125 MG: 125 CAPSULE, COATED PELLETS ORAL at 20:31

## 2025-07-02 RX ADMIN — VERAPAMIL HYDROCHLORIDE 80 MG: 40 TABLET ORAL at 17:10

## 2025-07-02 NOTE — CASE MANAGEMENT/SOCIAL WORK
Case Management/Social Work    Patient Name:  Kristine Hahn  YOB: 1935  MRN: [id number]  Admit Date:  6/28/2025        08:35 EDT   Discharge Plan       Row Name 07/02/25 0835       Plan    Plan Patient to return to [facility] for LTC once medically ready.                        Electronically signed by:  George Cain RN  07/02/25 08:35 EDT

## 2025-07-02 NOTE — PLAN OF CARE
Goal Outcome Evaluation:              Outcome Evaluation: MBSS completed with pt. seated upright in dedicated chair for exam. She was pleasant and cooperative. She was given trials of mechanical soft solids, pudding, honey-thick, and nectar-thick liquid. Oral phase was mildly impaired due to generalized weakness and extended prep time and piecemeal deglutition with mech soft, and extended transit intermittently with various consistencies. Moderate pharyngeal phase dysphagia due to delayed initiation of pharyngeal swallow with vallecular pooling and further loss to pyrifoms intermittently with liquid consistencies. She further exhibited decreased laryngeal elevation with deep laryngeal penetration consistently with aspiration intermittently of nectar-thick liquids. Pt. was sensate exhibited by multiple attempts to clear with light cough and throat clearing, but only somewhat effective. She also exhibited mild tongue base retraction with vallecular residue post swallows with various consistencies. Notably, she has a dx of GERD. Recommend: 1. mechanical soft diet with honey-thick liquids as nghia, 2. meds with pudding/applesauce, 3. aspiration precautions, 4. reflux precautions. D/W pt. and RN following exam.    Anticipated Discharge Disposition (SLP): skilled nursing facility          SLP Swallowing Diagnosis: oral dysphagia, pharyngeal dysphagia, esophageal dysphagia (07/02/25 9290)

## 2025-07-02 NOTE — PROGRESS NOTES
[facility] HOSPITALIST    PROGRESS NOTE    Name:  Kristine Hahn   Age:  89 y.o.  Sex:  female  :  1935  MRN:  [id number]   Visit Number:  [id number]  Admission Date:  2025  Date Of Service:  25  Primary Care Physician:  Felton Choe MD     LOS: 4 days :    Chief Complaint:      Dyspnea    Subjective:    Patient seen and examined.  She remains lethargic, however is able to answer some of my questions today.  Per RN at bedside appetite remains poor.  Gentle fluids initiated today.    Hospital Course:    Patient is an 89-year-old female transferred by ambulance from nursing home due to concern for confusion and shortness of breath.  Pertinent past medical history included COPD, iron deficiency anemia, GERD, and dementia.  Patient is requiring 3 L nasal cannula which represents a new oxygen requirement, as she typically does not require O2.  Patient states she has been short of breath over the last 24 hours, has been progressively worsening.  Denies any chest pain or any other concerns.     In the emergency department, Labs show mild elevations in white count and procalcitonin otherwise largely unremarkable. CT PE study shows left lower lobe and right middle lobe infiltrates concerning for pneumonia.  Patient was initiated on Unasyn for pneumonia.  Hospitalist consulted for further medical management.  Patient underwent modified barium swallow per SLP with recommended diet adjustments.  Seroquel initiated due to agitation noted at night.    Review of Systems:     All systems were reviewed and negative except as mentioned in subjective, assessment and plan.    Vital Signs:    Temp:  [97.6 °F (36.4 °C)-99.3 °F (37.4 °C)] 99.2 °F (37.3 °C)  Heart Rate:  [68-82] 75  Resp:  [16-18] 18  BP: (106-153)/(55-84) 153/71    Intake and output:    I/O last 3 completed shifts:  In: 2945.5 [I.V.:2945.5]  Out: -   I/O this shift:  In: 160 [P.O.:160]  Out: -     Physical  "Examination:    General Appearance:  Drowsy chronically ill elderly female.   Head:  Atraumatic and normocephalic.   Eyes: Conjunctivae and sclerae normal, no icterus. No pallor.   Throat: No oral lesions, no thrush, oral mucosa moist.   Neck: Supple, trachea midline, no thyromegaly.   Lungs:   Breath sounds heard bilaterally equally.  No wheezing or crackles. No Pleural rub or bronchial breathing.  On 2 L unlabored.  On 2 L nasal cannula.   Heart:  Normal S1 and S2, no murmur, no gallop, no rub. No JVD.   Abdomen:   Normal bowel sounds, no masses, no organomegaly. Soft, nontender, nondistended, no rebound tenderness.   Extremities: Supple, no edema, no cyanosis, no clubbing.   Skin: No bleeding or rash.   Neurologic: Drowsy.  Able to tell me that she has 2 sons, 1 .. No facial asymmetry. Moves all four limbs. No tremors. Generalized weakness.     Laboratory results:    Results from last 7 days   Lab Units 25  0528 25  0748 25  1452   SODIUM mmol/L 142 139 135*   POTASSIUM mmol/L 3.1* 4.1 3.9   CHLORIDE mmol/L 97* 100 98   CO2 mmol/L 29.3* 25.3 26.6   BUN mg/dL 11.0 24.0* 21.0   CREATININE mg/dL 0.37* 0.46* 0.53*   CALCIUM mg/dL 8.4* 9.5 8.8   BILIRUBIN mg/dL  --   --  0.7   ALK PHOS U/L  --   --  89   ALT (SGPT) U/L  --   --  15   AST (SGOT) U/L  --   --  36*   GLUCOSE mg/dL 56* 123* 103*     Results from last 7 days   Lab Units 25  0528 25  0748 25  1452   WBC 10*3/mm3 12.28* 11.37* 11.88*   HEMOGLOBIN g/dL 11.6* 12.5 11.9*   HEMATOCRIT % 36.3 36.9 35.0   PLATELETS 10*3/mm3 361 359 311         Results from last 7 days   Lab Units 25  1625 25  1452   HSTROP T ng/L 19* 20*     Results from last 7 days   Lab Units 25  1625 25  1620   BLOODCX  No growth at 3 days No growth at 3 days     No results for input(s): \"PHART\", \"BCR6UGA\", \"PO2ART\", \"BHR8MCN\", \"BASEEXCESS\" in the last 8760 hours.   I have reviewed the patient's laboratory results.    Radiology " results:    No radiology results from the last 24 hrs  I have reviewed the patient's radiology reports.    Medication Review:     I have reviewed the patient's active and prn medications.     Problem List:      Pneumonia      Assessment:    Acute hypoxic respiratory failure  Community-acquired pneumonia  Chronic COPD  Dementia  Iron deficiency anemia  GERD  Seizure disorder    Plan:    Acute hypoxic respiratory failure  Community-acquired pneumonia  Chronic COPD  Patient has new oxygen requirements of 2 liters.  Evidence of pneumonia on CT scan with elevation of procalcitonin.  IV Unasyn  DuoNebs.  Mucinex  Patient somnolent, was given Seroquel at midnight.  Will hold sedating medications and schedule Seroquel nightly due to underlying dementia.  Aspiration could be contributing to pneumonia.  Seen by SLP who suggested modified barium swallow study.  Modified barium swallow completed today with diet adjustments made.  Underlying dementia complicating all aspects of her care.  Overall poor appetite noted with gentle IV fluids today.  Iron deficiency anemia  GERD  Resume home medications as appropriate.      -Discussed full CODE STATUS with son George who states he is POA.  Advised patient overall appears to have decline related to underlying dementia with poor appetite and dysphagia.  Advised patient would likely not tolerate aggressive medical treatment.  George wishes to maintain full CODE STATUS at this time.    I have reviewed the copied text and it is accurate as of 7/2/2025      DVT Prophylaxis: SCDs  Code Status: Full code  Diet: Mechanical ground/honey thickened liquids  Discharge Plan: Return to nursing home in 1 to 2 days.    Arielle Dukes, APRN  07/02/25  12:12 EDT    Dictated utilizing Dragon dictation.

## 2025-07-02 NOTE — PLAN OF CARE
Goal Outcome Evaluation:           Progress: improving  Outcome Evaluation: VSS on 2L O2 via nasal cannula.  Disoriented to time and place.  IV antibiotic (see MAR) administered.  Continued cardiac monitoring.  No acute events noted during shift. Continue with plan of care.

## 2025-07-02 NOTE — PLAN OF CARE
Problem: Adult Inpatient Plan of Care  Goal: Plan of Care Review  Outcome: Progressing  Flowsheets (Taken 7/2/2025 1724)  Progress: improving  Plan of Care Reviewed With: patient  Goal: Patient-Specific Goal (Individualized)  Outcome: Progressing  Goal: Absence of Hospital-Acquired Illness or Injury  Outcome: Progressing  Intervention: Identify and Manage Fall Risk  Recent Flowsheet Documentation  Taken 7/2/2025 1600 by Keke Valdez RN  Safety Promotion/Fall Prevention: safety round/check completed  Taken 7/2/2025 1400 by Keke Valdez RN  Safety Promotion/Fall Prevention:   safety round/check completed   activity supervised   assistive device/personal items within reach   clutter free environment maintained   fall prevention program maintained   nonskid shoes/slippers when out of bed  Taken 7/2/2025 1219 by Keke Valdez RN  Safety Promotion/Fall Prevention: safety round/check completed  Taken 7/2/2025 1000 by Keke Valdez RN  Safety Promotion/Fall Prevention:   safety round/check completed   activity supervised   assistive device/personal items within reach   clutter free environment maintained   fall prevention program maintained   nonskid shoes/slippers when out of bed  Taken 7/2/2025 0827 by Keke Valdez RN  Safety Promotion/Fall Prevention:   activity supervised   assistive device/personal items within reach   clutter free environment maintained   gait belt   fall prevention program maintained   room organization consistent   safety round/check completed  Intervention: Prevent Skin Injury  Recent Flowsheet Documentation  Taken 7/2/2025 1600 by Keke Valdez RN  Body Position: legs elevated  Taken 7/2/2025 1400 by Keke Valdez RN  Body Position: heels elevated  Taken 7/2/2025 1219 by Keke Valdez RN  Body Position:   turned   left  Taken 7/2/2025 1000 by Keke Valdez RN  Body Position:   turned   right  Taken 7/2/2025 0827 by Keke Valdez RN  Body  Position: sitting up in bed  Intervention: Prevent and Manage VTE (Venous Thromboembolism) Risk  Recent Flowsheet Documentation  Taken 7/2/2025 0827 by Keke Valdez RN  VTE Prevention/Management:   bilateral   SCDs (sequential compression devices) off  Intervention: Prevent Infection  Recent Flowsheet Documentation  Taken 7/2/2025 1600 by Keke Valdez RN  Infection Prevention: environmental surveillance performed  Taken 7/2/2025 1400 by Keke Valdez RN  Infection Prevention:   environmental surveillance performed   hand hygiene promoted   rest/sleep promoted  Taken 7/2/2025 1219 by Keke Valdez RN  Infection Prevention:   environmental surveillance performed   rest/sleep promoted   single patient room provided  Taken 7/2/2025 1000 by Keke Valdez RN  Infection Prevention:   environmental surveillance performed   hand hygiene promoted   rest/sleep promoted  Taken 7/2/2025 0827 by Keke Valdez RN  Infection Prevention:   environmental surveillance performed   single patient room provided   rest/sleep promoted  Goal: Optimal Comfort and Wellbeing  Outcome: Progressing  Intervention: Provide Person-Centered Care  Recent Flowsheet Documentation  Taken 7/2/2025 0827 by Keke Valdez RN  Trust Relationship/Rapport: questions answered  Goal: Readiness for Transition of Care  Outcome: Progressing  Goal: Plan of Care Review  Outcome: Progressing  Flowsheets (Taken 7/2/2025 1724)  Progress: improving  Plan of Care Reviewed With: patient  Goal: Patient-Specific Goal (Individualized)  Outcome: Progressing  Goal: Absence of Hospital-Acquired Illness or Injury  Outcome: Progressing  Intervention: Identify and Manage Fall Risk  Recent Flowsheet Documentation  Taken 7/2/2025 1600 by Keke Valdez RN  Safety Promotion/Fall Prevention: safety round/check completed  Taken 7/2/2025 1400 by Keke Valdez RN  Safety Promotion/Fall Prevention:   safety round/check completed   activity  supervised   assistive device/personal items within reach   clutter free environment maintained   fall prevention program maintained   nonskid shoes/slippers when out of bed  Taken 7/2/2025 1219 by Keke Valdez RN  Safety Promotion/Fall Prevention: safety round/check completed  Taken 7/2/2025 1000 by Keke Valdez RN  Safety Promotion/Fall Prevention:   safety round/check completed   activity supervised   assistive device/personal items within reach   clutter free environment maintained   fall prevention program maintained   nonskid shoes/slippers when out of bed  Taken 7/2/2025 0827 by Keke Valdez RN  Safety Promotion/Fall Prevention:   activity supervised   assistive device/personal items within reach   clutter free environment maintained   gait belt   fall prevention program maintained   room organization consistent   safety round/check completed  Intervention: Prevent Skin Injury  Recent Flowsheet Documentation  Taken 7/2/2025 1600 by Keke Valdez RN  Body Position: legs elevated  Taken 7/2/2025 1400 by Keke Valdez RN  Body Position: heels elevated  Taken 7/2/2025 1219 by Keke Valdez RN  Body Position:   turned   left  Taken 7/2/2025 1000 by Keke Valdez RN  Body Position:   turned   right  Taken 7/2/2025 0827 by Keke Valdez RN  Body Position: sitting up in bed  Intervention: Prevent and Manage VTE (Venous Thromboembolism) Risk  Recent Flowsheet Documentation  Taken 7/2/2025 0827 by Keke Valdez RN  VTE Prevention/Management:   bilateral   SCDs (sequential compression devices) off  Intervention: Prevent Infection  Recent Flowsheet Documentation  Taken 7/2/2025 1600 by Keke Valdez RN  Infection Prevention: environmental surveillance performed  Taken 7/2/2025 1400 by Keke Valdez RN  Infection Prevention:   environmental surveillance performed   hand hygiene promoted   rest/sleep promoted  Taken 7/2/2025 1219 by Keke Valdez RN  Infection  Prevention:   environmental surveillance performed   rest/sleep promoted   single patient room provided  Taken 7/2/2025 1000 by Keke Valdez RN  Infection Prevention:   environmental surveillance performed   hand hygiene promoted   rest/sleep promoted  Taken 7/2/2025 0827 by Keke Valdez RN  Infection Prevention:   environmental surveillance performed   single patient room provided   rest/sleep promoted  Goal: Optimal Comfort and Wellbeing  Outcome: Progressing  Intervention: Provide Person-Centered Care  Recent Flowsheet Documentation  Taken 7/2/2025 0827 by Keke Valdez RN  Trust Relationship/Rapport: questions answered  Goal: Readiness for Transition of Care  Outcome: Progressing  Goal: Plan of Care Review  Outcome: Progressing  Flowsheets (Taken 7/2/2025 1724)  Progress: improving  Plan of Care Reviewed With: patient  Goal: Patient-Specific Goal (Individualized)  Outcome: Progressing  Goal: Absence of Hospital-Acquired Illness or Injury  Outcome: Progressing  Intervention: Identify and Manage Fall Risk  Recent Flowsheet Documentation  Taken 7/2/2025 1600 by Keke Valdez RN  Safety Promotion/Fall Prevention: safety round/check completed  Taken 7/2/2025 1400 by Keke Valdez RN  Safety Promotion/Fall Prevention:   safety round/check completed   activity supervised   assistive device/personal items within reach   clutter free environment maintained   fall prevention program maintained   nonskid shoes/slippers when out of bed  Taken 7/2/2025 1219 by Keke Valdez RN  Safety Promotion/Fall Prevention: safety round/check completed  Taken 7/2/2025 1000 by Keke Valdez RN  Safety Promotion/Fall Prevention:   safety round/check completed   activity supervised   assistive device/personal items within reach   clutter free environment maintained   fall prevention program maintained   nonskid shoes/slippers when out of bed  Taken 7/2/2025 0827 by Keke Valdez RN  Safety  Promotion/Fall Prevention:   activity supervised   assistive device/personal items within reach   clutter free environment maintained   gait belt   fall prevention program maintained   room organization consistent   safety round/check completed  Intervention: Prevent Skin Injury  Recent Flowsheet Documentation  Taken 7/2/2025 1600 by Keke Valdez RN  Body Position: legs elevated  Taken 7/2/2025 1400 by Keke Valdez RN  Body Position: heels elevated  Taken 7/2/2025 1219 by Keke Valdez RN  Body Position:   turned   left  Taken 7/2/2025 1000 by Keke Valdez RN  Body Position:   turned   right  Taken 7/2/2025 0827 by Keke Valdez RN  Body Position: sitting up in bed  Intervention: Prevent and Manage VTE (Venous Thromboembolism) Risk  Recent Flowsheet Documentation  Taken 7/2/2025 0827 by Keke Valdez RN  VTE Prevention/Management:   bilateral   SCDs (sequential compression devices) off  Intervention: Prevent Infection  Recent Flowsheet Documentation  Taken 7/2/2025 1600 by Keke Valdez RN  Infection Prevention: environmental surveillance performed  Taken 7/2/2025 1400 by Keke Valdez RN  Infection Prevention:   environmental surveillance performed   hand hygiene promoted   rest/sleep promoted  Taken 7/2/2025 1219 by Keke Valdez RN  Infection Prevention:   environmental surveillance performed   rest/sleep promoted   single patient room provided  Taken 7/2/2025 1000 by Keke Valdez RN  Infection Prevention:   environmental surveillance performed   hand hygiene promoted   rest/sleep promoted  Taken 7/2/2025 0827 by Keke Valdez RN  Infection Prevention:   environmental surveillance performed   single patient room provided   rest/sleep promoted  Goal: Optimal Comfort and Wellbeing  Outcome: Progressing  Intervention: Provide Person-Centered Care  Recent Flowsheet Documentation  Taken 7/2/2025 0827 by Keke Valdez RN  Trust Relationship/Rapport:  questions answered  Goal: Readiness for Transition of Care  Outcome: Progressing   Goal Outcome Evaluation:  Plan of Care Reviewed With: patient        Progress: improving

## 2025-07-02 NOTE — PROGRESS NOTES
Patient Name: Kristine Hahn  YOB: 1935  MRN: [id number]  Admission date: 6/28/2025  Reason for Encounter: Follow-up/Progress Note      [facility] Clinical Nutrition Progress Note       Nutrition Intervention Updates: Will continue to follow-up and monitor PO intake       Subjective: 7/2: PO intake averaging 0% over the past 3 meals. Patient lethargic per provider note. Will liberalize diet by removing heart healthy modifier. Fortified foods ordered to encourage intake.     6/30: Pt w/ MST score of 2 - unsure of recent weight loss per chart review. Patient requiring soft to chew diet w/ NTL and requires feeding assistance. PO intake averaging 33% x 3 meals.        PO Diet: Diet: Cardiac, Diabetic; Healthy Heart (2-3 Na+); Consistent Carbohydrate; Feeding Assistance - Nursing, No Mixed Consistencies; Texture: Soft to Chew (NDD 3); Soft to Chew: Ground Meat; Fluid Consistency: Nectar Thick   PO Supplements: Fortified Pudding BID   PO Intake:  7/1: 0% x 3 meals   6/30: 33% x 3 meals    Labs: Labs reviewed        GI Function:  WNL         Brief Weight Review:    Wt Readings from Last 1 Encounters:   07/02/25 0617 56.8 kg (125 lb 3.5 oz)   07/01/25 0600 57.7 kg (127 lb 3.3 oz)   06/30/25 0537 56.5 kg (124 lb 9 oz)   06/29/25 0325 56.9 kg (125 lb 7.1 oz)   06/28/25 1918 59.5 kg (131 lb 2.8 oz)   06/28/25 1446 43.6 kg (96 lb 1.9 oz)        Results from last 7 days   Lab Units 07/02/25  0528 06/29/25  0748 06/28/25  1452   SODIUM mmol/L 142 139 135*   POTASSIUM mmol/L 3.1* 4.1 3.9   CHLORIDE mmol/L 97* 100 98   CO2 mmol/L 29.3* 25.3 26.6   BUN mg/dL 11.0 24.0* 21.0   CREATININE mg/dL 0.37* 0.46* 0.53*   CALCIUM mg/dL 8.4* 9.5 8.8   BILIRUBIN mg/dL  --   --  0.7   ALK PHOS U/L  --   --  89   ALT (SGPT) U/L  --   --  15   AST (SGOT) U/L  --   --  36*   GLUCOSE mg/dL 56* 123* 103*     Results from last 7 days   Lab Units 07/02/25  0528 06/29/25  0748 06/28/25  1452   MAGNESIUM mg/dL  --   --  2.0  "  PLATELETS 10*3/mm3 361   < > 311   HEMOGLOBIN g/dL 11.6*   < > 11.9*   HEMATOCRIT % 36.3   < > 35.0    < > = values in this interval not displayed.     No results found for: \"HGBA1C\"    Electronically signed by:  Daniel Lala RD  07/02/25 08:42 EDT     "

## 2025-07-02 NOTE — MBS/VFSS/FEES
Acute Care - Speech Language Pathology   Swallow Modified Barium Swallow Study [facility]     Patient Name: Kristine Hahn  : 1935  MRN: [id number]  Today's Date: 2025               Admit Date: 2025    Visit Dx:     ICD-10-CM ICD-9-CM   1. Acute hypoxic respiratory failure  J96.01 518.81   2. Pneumonia of both lungs due to infectious organism, unspecified part of lung  J18.9 483.8     Patient Active Problem List   Diagnosis    Severe late onset Alzheimer's dementia without behavioral disturbance, psychotic disturbance, mood disturbance, or anxiety    COPD (chronic obstructive pulmonary disease)    Primary hypertension    Gastroesophageal reflux disease without esophagitis    Pneumonia     Past Medical History:   Diagnosis Date    Anemia     Arthritis     Cancer     GERD (gastroesophageal reflux disease)     Glaucoma      History reviewed. No pertinent surgical history.    SLP Recommendation and Plan  SLP Swallowing Diagnosis: oral dysphagia, pharyngeal dysphagia, esophageal dysphagia (25)  SLP Diet Recommendation: mechanical ground textures, honey thick liquids (25)  Recommended Precautions and Strategies: upright posture during/after eating, small bites of food and sips of liquid, check mouth frequently for oral residue/pocketing, general aspiration precautions, reflux precautions, fatigue precautions, assist with feeding (25)  SLP Rec. for Method of Medication Administration: meds crushed, with puree, as tolerated (25)     Monitor for Signs of Aspiration: yes, notify SLP if any concerns (25)        Anticipated Discharge Disposition (SLP): skilled nursing facility (25)  Rehab Potential/Prognosis, Swallowing: adequate, monitor progress closely (25)        Oral Care Recommendations: Oral Care BID/PRN, Suction toothbrush (25)     Swallowing Considerations per Physician Discretion: medical management of  suspected esophageal dysphagia, as indicated (07/02/25 1045)                                  Outcome Evaluation: MBSS completed with pt. seated upright in dedicated chair for exam. She was pleasant and cooperative. She was given trials of mechanical soft solids, pudding, honey-thick, and nectar-thick liquid. Oral phase was mildly impaired due to generalized weakness and extended prep time and piecemeal deglutition with mech soft, and extended transit intermittently with various consistencies. Moderate pharyngeal phase dysphagia due to delayed initiation of pharyngeal swallow with vallecular pooling and further loss to pyrifoms intermittently with liquid consistencies. She further exhibited decreased laryngeal elevation with deep laryngeal penetration consistently with aspiration intermittently of nectar-thick liquids. Pt. was sensate exhibited by multiple attempts to clear with light cough and throat clearing, but only somewhat effective. She also exhibited mild tongue base retraction with vallecular residue post swallows with various consistencies. Notably, she has a dx of GERD. Recommend: 1. mechanical soft diet with honey-thick liquids as nghia, 2. meds with pudding/applesauce, 3. aspiration precautions, 4. reflux precautions. D/W pt. and RN following exam.      SWALLOW EVALUATION (Last 72 Hours)       SLP Adult Swallow Evaluation       Row Name 07/02/25 1045 06/30/25 [id number]                Rehab Evaluation    Document Type other (see comments)  MBSS  -TM evaluation  -MD       Subjective Information no complaints  -TM no complaints  -MD       Patient Observations alert;cooperative  -TM alert;cooperative  -MD       Patient/Family/Caregiver Comments/Observations no family present for exam  -TM Son at bedside  -MD       Patient Effort adequate  -TM adequate  -MD       Symptoms Noted During/After Treatment -- none  -MD          General Information    Patient Profile Reviewed yes  -TM yes  -MD       Pertinent History Of  Current Problem COPD, pneu, GERD  -TM Acute hypoxic resp failure, community acquired pna, acute exacerbation of COPD, iron deficiency anemia, GERD.  -MD       Current Method of Nutrition soft to chew textures;nectar/syrup-thick liquids  -TM regular textures;thin liquids  -MD       Precautions/Limitations, Vision WFL with corrective lenses  -TM WFL;for purposes of eval  -MD       Precautions/Limitations, Hearing WFL;for purposes of eval  -TM WFL;for purposes of eval  -MD       Prior Level of Function-Communication cognitive-linguistic impairment  -TM cognitive-linguistic impairment  -MD       Prior Level of Function-Swallowing no diet consistency restrictions  -TM no diet consistency restrictions  -MD       Plans/Goals Discussed with patient;other (see comments)  RN  -TM patient and family;agreed upon;other (see comments)  RN  -MD       Barriers to Rehab cognitive status;medically complex  -TM cognitive status;medically complex  -MD       Patient's Goals for Discharge patient did not state  -TM patient did not state  -MD       Family Goals for Discharge -- other (see comments)  Get rid of pneumonia  -MD          Pain    Pretreatment Pain Rating 0/10 - no pain  -TM 0/10 - no pain  -MD       Posttreatment Pain Rating 0/10 - no pain  -TM 0/10 - no pain  -MD          Oral Motor Structure and Function    Oral Lesions or Structural Abnormalities and/or variants none identified  -TM --       Dentition Assessment natural, present and adequate  -TM natural, present and adequate  -MD       Secretion Management WNL/WFL  -TM WNL/WFL  -MD       Mucosal Quality moist, healthy  -TM moist, healthy  -MD          Oral Musculature and Cranial Nerve Assessment    Oral Motor General Assessment generalized oral motor weakness  -TM generalized oral motor weakness  -MD          General Eating/Swallowing Observations    Respiratory Support Currently in Use -- nasal cannula  -MD       O2 Liters -- other (see comments)  1.5L  -MD        "Eating/Swallowing Skills -- fed by SLP  -MD       Positioning During Eating -- upright in bed  -MD       Utensils Used -- spoon;cup;straw  -MD       Consistencies Trialed -- regular textures;thin liquids;ice chips;pureed;nectar/syrup-thick liquids  -MD       Pre SpO2 (%) -- 96  -MD       Post SpO2 (%) -- 94  -MD          Respiratory    Respiratory Status WFL  -TM WFL  -MD          Clinical Swallow Eval    Oral Prep Phase -- HIMANSHU  -MD       Oral Transit -- WFL  -MD       Oral Residue -- WFL  -MD       Pharyngeal Phase -- suspected pharyngeal impairment  -MD       Esophageal Phase -- unremarkable  -MD       Clinical Swallow Evaluation Summary -- Bedside eval of swallow completed, with son at bedside. Son reports patient \"has been getting stranged on her drinks for the last month.\" Has been on a regular/thin diet. SLP sat patient's bed in an upright position, prior to any PO trials, and son stated, \"it seems harder for her to breathe when she's sitting up so she usually stays laying back.\" SLP educated son on patient needing to be upright for all PO intake, due to a reclined position opening airway and increasing risk for aspiration. He stated, \"no one has ever told me that but it makes sense.\" Oral mech completed, with patient's natural teeth present and adequate for chewing. Generalized oral motor weakness. PO trials of thin, puree, regular, and nectar thick liquids. Throat clears and coughing observed with trials of thin. No overt ss aspiration with NTL via straw, spoon, or trial of regular. However, patient is at risk for a decline in swallowing, throughout a meal of regular, because of likely development of increased fatigue from chewing.  Recommend: Mechanical soft, with ground meat, and nectar thick liquids. 2. meds crushed in puree, as tolerated. 3. Because of son reporting progressive difficulty with thin liquids and new dx of pna, patient would benefit from a modified barium swallow study 7/1/25, to further " assess pharyngeal stage of swallow, prior to returning to SNF. 4. General aspiration and reflux precautions. Discussed with son and RN.  -MD          Pharyngeal Phase Concerns    Pharyngeal Phase Concerns -- cough;throat clear  -MD       Cough -- thin  -MD       Throat Clear -- thin  -MD          MBS/VFSS    Utensils Used spoon;cup;straw  -TM --       Consistencies Trialed mechanical ground textures;pudding thick;honey-thick liquids;nectar/syrup-thick liquids  -TM --          MBS/VFSS Interpretation    Oral Prep Phase impaired oral phase of swallowing  -TM --       Oral Transit Phase impaired  -TM --       Oral Residue WFL  -TM --       VFSS Summary MBSS completed with pt. seated upright in dedicated chair for exam.  She was pleasant and cooperative.  She was given trials of mechanical soft solids, pudding, honey-thick, and nectar-thick liquid. Oral phase was mildly impaired due to generalized weakness and extended prep time and piecemeal deglutition with mech soft, and extended transit intermittently with various consistencies.  Moderate pharyngeal phase dysphagia due to delayed initiation of pharyngeal swallow with vallecular pooling and further loss to pyrifoms intermittently with liquid consistencies. She further exhibited decreased laryngeal elevation with deep laryngeal penetration consistently with aspiration intermittently of nectar-thick liquids. Pt. was sensate exhibited by multiple attempts to clear with light cough and throat clearing, but only somewhat effective.   She also exhibited mild tongue base retraction with vallecular residue post swallows with various consistencies.  Notably, she has a dx of GERD.         Recommend:  1. mechanical soft diet with honey-thick liquids as nghia, 2. meds with pudding/applesauce, 3. aspiration precautions, 4. reflux precautions.  D/W pt. and RN following exam.  -TM --          Oral Preparatory Phase    Oral Preparatory Phase other (see comments)  extended bolus prep  and piecemeal deglutition  -TM --          Oral Transit Phase    Impaired Oral Transit Phase increased A-P transit time  -TM --          Initiation of Pharyngeal Swallow    Initiation of Pharyngeal Swallow bolus in valleculae;bolus in pyriform sinuses  -TM --       Pharyngeal Phase impaired pharyngeal phase of swallowing  -TM --       Penetration During the Swallow nectar-thick liquids;secondary to reduced laryngeal elevation  -TM --       Aspiration During the Swallow nectar-thick liquids;secondary to reduced laryngeal elevation  -TM --       Depth of Penetration deep  -TM --       Response to Penetration Yes  -TM --       Responsed to penetration with throat clear;cough  -TM --       Response to Aspiration Yes  -TM --       Responded to aspiration with throat clear;cough  -TM --       Rosenbek's Scale 7--->level 7  -TM --       Pharyngeal Residue valleculae;secondary to reduced base of tongue retraction  -TM --       Response to Residue partial residue clearance  -TM --          Esophageal Phase    Esophageal Phase other (see comments)  dx of GERD  -TM --          SLP Communication to Radiology    Severity Level of Dysphagia mild dysphagia;oral dysfunction;moderate dysphagia;pharyngeal dysfunction  -TM --       Consistencies Aspirated/Penetrated penetrated and aspirated;nectar-thick liquids  -TM --       Summary Statement mild oral dysphagia; moderate pharyngeal phase dysphagia; third stage dysphagia  -TM --          SLP Evaluation Clinical Impression    SLP Swallowing Diagnosis oral dysphagia;pharyngeal dysphagia;esophageal dysphagia  -TM suspected pharyngeal dysphagia  -MD       Functional Impact risk of aspiration/pneumonia;risk of malnutrition;risk of dehydration  -TM risk of aspiration/pneumonia;risk of malnutrition;risk of dehydration  -MD       Rehab Potential/Prognosis, Swallowing adequate, monitor progress closely  -TM adequate, monitor progress closely  -MD       Swallow Criteria for Skilled Therapeutic  Interventions Met -- demonstrates skilled criteria  -MD          SLP Treatment Clinical Impressions    Barriers to Overall Progress (SLP) Medically complex;Advanced age  -TM --          Recommendations    Therapy Frequency (Swallow) -- PRN  -MD       Predicted Duration Therapy Intervention (Days) -- until discharge  -MD       SLP Diet Recommendation mechanical ground textures;honey thick liquids  -TM soft to chew textures;ground;nectar thick liquids  -MD       Recommended Diagnostics -- VFSS (MBS);other (see comments)  7/1/25  -MD       Recommended Precautions and Strategies upright posture during/after eating;small bites of food and sips of liquid;check mouth frequently for oral residue/pocketing;general aspiration precautions;reflux precautions;fatigue precautions;assist with feeding  -TM upright posture during/after eating;small bites of food and sips of liquid;check mouth frequently for oral residue/pocketing;general aspiration precautions;reflux precautions;fatigue precautions;assist with feeding  -MD       Oral Care Recommendations Oral Care BID/PRN;Suction toothbrush  -TM Oral Care BID/PRN;Suction toothbrush  -MD       SLP Rec. for Method of Medication Administration meds crushed;with puree;as tolerated  -TM meds crushed;with puree;as tolerated  -MD       Monitor for Signs of Aspiration yes;notify SLP if any concerns  -TM yes;notify SLP if any concerns  -MD       Anticipated Discharge Disposition (SLP) skilled nursing facility  -TM skilled nursing facility  -MD       Swallowing Considerations per Physician Discretion medical management of suspected esophageal dysphagia, as indicated  - --                 User Key  (r) = Recorded By, (t) = Taken By, (c) = Cosigned By      Initials Name Effective Dates    TM Tess Larson 06/16/21 -     Marycarmen Gill MA,CCC-SLP 06/05/25 -                     EDUCATION  The patient has been educated in the following areas:   Dysphagia (Swallowing Impairment) Oral  Care/Hydration Modified Diet Instruction.                Time Calculation:    Time Calculation- SLP       Row Name 07/02/25 1304             Time Calculation- SLP    SLP Start Time 1045  -TM      SLP Received On 07/02/25  -                User Key  (r) = Recorded By, (t) = Taken By, (c) = Cosigned By      Initials Name Provider Type     Tess Larson Speech and Language Pathologist                    Therapy Charges for Today       Code Description Service Date Service Provider Modifiers Qty    [id number] HC ST MOTION FLUORO EVAL SWALLOW 6 7/2/2025 Tess Larson GN 1                 Tess Larson  7/2/2025

## 2025-07-03 VITALS
WEIGHT: 124.34 LBS | TEMPERATURE: 98 F | SYSTOLIC BLOOD PRESSURE: 167 MMHG | DIASTOLIC BLOOD PRESSURE: 73 MMHG | RESPIRATION RATE: 18 BRPM | HEART RATE: 59 BPM | BODY MASS INDEX: 22.88 KG/M2 | OXYGEN SATURATION: 96 % | HEIGHT: 62 IN

## 2025-07-03 PROBLEM — J18.9 PNEUMONIA, UNSPECIFIED ORGANISM: Status: ACTIVE | Noted: 2025-07-03

## 2025-07-03 LAB
ANION GAP SERPL CALCULATED.3IONS-SCNC: 9.4 MMOL/L (ref 5–15)
BACTERIA SPEC AEROBE CULT: NORMAL
BACTERIA SPEC AEROBE CULT: NORMAL
BASOPHILS # BLD AUTO: 0.11 10*3/MM3 (ref 0–0.2)
BASOPHILS NFR BLD AUTO: 1 % (ref 0–1.5)
BUN SERPL-MCNC: 7 MG/DL (ref 8–23)
BUN/CREAT SERPL: 21.2 (ref 7–25)
CALCIUM SPEC-SCNC: 8.3 MG/DL (ref 8.6–10.5)
CHLORIDE SERPL-SCNC: 101 MMOL/L (ref 98–107)
CO2 SERPL-SCNC: 29.6 MMOL/L (ref 22–29)
CREAT SERPL-MCNC: 0.33 MG/DL (ref 0.57–1)
DEPRECATED RDW RBC AUTO: 47.3 FL (ref 37–54)
EGFRCR SERPLBLD CKD-EPI 2021: 99.2 ML/MIN/1.73
EOSINOPHIL # BLD AUTO: 0.49 10*3/MM3 (ref 0–0.4)
EOSINOPHIL NFR BLD AUTO: 4.2 % (ref 0.3–6.2)
ERYTHROCYTE [DISTWIDTH] IN BLOOD BY AUTOMATED COUNT: 13.5 % (ref 12.3–15.4)
GLUCOSE SERPL-MCNC: 81 MG/DL (ref 65–99)
HCT VFR BLD AUTO: 35.9 % (ref 34–46.6)
HGB BLD-MCNC: 11.6 G/DL (ref 12–15.9)
IMM GRANULOCYTES # BLD AUTO: 0.45 10*3/MM3 (ref 0–0.05)
IMM GRANULOCYTES NFR BLD AUTO: 3.9 % (ref 0–0.5)
LYMPHOCYTES # BLD AUTO: 2 10*3/MM3 (ref 0.7–3.1)
LYMPHOCYTES NFR BLD AUTO: 17.3 % (ref 19.6–45.3)
MCH RBC QN AUTO: 30.6 PG (ref 26.6–33)
MCHC RBC AUTO-ENTMCNC: 32.3 G/DL (ref 31.5–35.7)
MCV RBC AUTO: 94.7 FL (ref 79–97)
MONOCYTES # BLD AUTO: 1.05 10*3/MM3 (ref 0.1–0.9)
MONOCYTES NFR BLD AUTO: 9.1 % (ref 5–12)
NEUTROPHILS NFR BLD AUTO: 64.5 % (ref 42.7–76)
NEUTROPHILS NFR BLD AUTO: 7.46 10*3/MM3 (ref 1.7–7)
NRBC BLD AUTO-RTO: 0 /100 WBC (ref 0–0.2)
PLATELET # BLD AUTO: 402 10*3/MM3 (ref 140–450)
PMV BLD AUTO: 9.1 FL (ref 6–12)
POTASSIUM SERPL-SCNC: 3.7 MMOL/L (ref 3.5–5.2)
POTASSIUM SERPL-SCNC: 4 MMOL/L (ref 3.5–5.2)
RBC # BLD AUTO: 3.79 10*6/MM3 (ref 3.77–5.28)
SODIUM SERPL-SCNC: 140 MMOL/L (ref 136–145)
WBC NRBC COR # BLD AUTO: 11.56 10*3/MM3 (ref 3.4–10.8)

## 2025-07-03 PROCEDURE — 84132 ASSAY OF SERUM POTASSIUM: CPT | Performed by: FAMILY MEDICINE

## 2025-07-03 PROCEDURE — 25010000002 AMPICILLIN-SULBACTAM PER 1.5 G: Performed by: FAMILY MEDICINE

## 2025-07-03 PROCEDURE — 80048 BASIC METABOLIC PNL TOTAL CA: CPT | Performed by: NURSE PRACTITIONER

## 2025-07-03 PROCEDURE — 99239 HOSP IP/OBS DSCHRG MGMT >30: CPT | Performed by: NURSE PRACTITIONER

## 2025-07-03 PROCEDURE — 85025 COMPLETE CBC W/AUTO DIFF WBC: CPT | Performed by: NURSE PRACTITIONER

## 2025-07-03 RX ORDER — KETOCONAZOLE 20 MG/G
CREAM TOPICAL DAILY
Start: 2025-07-03

## 2025-07-03 RX ORDER — QUETIAPINE FUMARATE 25 MG/1
25 TABLET, FILM COATED ORAL 2 TIMES DAILY
Start: 2025-07-03

## 2025-07-03 RX ORDER — VIT C/E/ZN/COPPR/LUTEIN/ZEAXAN 250MG-90MG
1 CAPSULE ORAL DAILY
Start: 2025-07-03

## 2025-07-03 RX ADMIN — POTASSIUM CHLORIDE 40 MEQ: 1500 TABLET, EXTENDED RELEASE ORAL at 02:16

## 2025-07-03 RX ADMIN — BUSPIRONE HYDROCHLORIDE 10 MG: 10 TABLET ORAL at 09:16

## 2025-07-03 RX ADMIN — AMPICILLIN SODIUM AND SULBACTAM SODIUM 3 G: 2; 1 INJECTION, POWDER, FOR SOLUTION INTRAMUSCULAR; INTRAVENOUS at 09:18

## 2025-07-03 RX ADMIN — Medication 10 ML: at 09:16

## 2025-07-03 RX ADMIN — ASPIRIN 81 MG CHEWABLE TABLET 81 MG: 81 TABLET CHEWABLE at 09:16

## 2025-07-03 RX ADMIN — Medication 1000 UNITS: at 09:16

## 2025-07-03 RX ADMIN — VERAPAMIL HYDROCHLORIDE 80 MG: 40 TABLET ORAL at 09:16

## 2025-07-03 RX ADMIN — DIVALPROEX SODIUM 125 MG: 125 CAPSULE, COATED PELLETS ORAL at 09:16

## 2025-07-03 RX ADMIN — OXYBUTYNIN CHLORIDE 5 MG: 5 TABLET, EXTENDED RELEASE ORAL at 09:16

## 2025-07-03 RX ADMIN — AMPICILLIN SODIUM AND SULBACTAM SODIUM 3 G: 2; 1 INJECTION, POWDER, FOR SOLUTION INTRAMUSCULAR; INTRAVENOUS at 01:18

## 2025-07-03 RX ADMIN — FAMOTIDINE 20 MG: 20 TABLET, FILM COATED ORAL at 09:16

## 2025-07-03 RX ADMIN — FERROUS SULFATE TAB EC 324 MG (65 MG FE EQUIVALENT) 324 MG: 324 (65 FE) TABLET DELAYED RESPONSE at 09:16

## 2025-07-03 NOTE — CASE MANAGEMENT/SOCIAL WORK
Case Management Discharge Note           Provided Post Acute Provider List?: N/A  Provided Post Acute Provider Quality & Resource List?: N/A    Selected Continued Care - Admitted Since 6/28/2025       Destination Coordination complete.      Service Provider Services Address Phone Fax Patient Preferred    [facility] [address] [place] [id number] [phone] --              Durable Medical Equipment    No services have been selected for the patient.                Dialysis/Infusion    No services have been selected for the patient.                Home Medical Care    No services have been selected for the patient.                Therapy    No services have been selected for the patient.                Community Resources    No services have been selected for the patient.                Community & DME    No services have been selected for the patient.                    Transportation Services  Transportation: Ambulance  Ambulance: [facility] Status: Accepted    Final Discharge Disposition Code: 04 - intermediate care facility

## 2025-07-03 NOTE — DISCHARGE INSTRUCTIONS
Patient to be discharged back to [facility] and rehab.  Continue diet as prescribed.  Recommended further decline hospice consult.

## 2025-07-03 NOTE — PLAN OF CARE
Problem: Adult Inpatient Plan of Care  Goal: Plan of Care Review  Outcome: Progressing  Goal: Patient-Specific Goal (Individualized)  Outcome: Progressing  Goal: Absence of Hospital-Acquired Illness or Injury  Outcome: Progressing  Intervention: Identify and Manage Fall Risk  Recent Flowsheet Documentation  Taken 7/3/2025 0200 by Lisa Lim RN  Safety Promotion/Fall Prevention:   safety round/check completed   nonskid shoes/slippers when out of bed   assistive device/personal items within reach   clutter free environment maintained  Taken 7/3/2025 0000 by Lisa Lim RN  Safety Promotion/Fall Prevention:   safety round/check completed   nonskid shoes/slippers when out of bed   assistive device/personal items within reach   clutter free environment maintained  Taken 7/2/2025 2200 by Lisa Lim RN  Safety Promotion/Fall Prevention:   safety round/check completed   nonskid shoes/slippers when out of bed   assistive device/personal items within reach   clutter free environment maintained  Taken 7/2/2025 2000 by Lisa Lim RN  Safety Promotion/Fall Prevention:   safety round/check completed   nonskid shoes/slippers when out of bed   assistive device/personal items within reach   clutter free environment maintained  Intervention: Prevent Skin Injury  Recent Flowsheet Documentation  Taken 7/3/2025 0200 by Lisa Lim RN  Body Position:   supine   legs elevated  Skin Protection: incontinence pads utilized  Taken 7/3/2025 0000 by Lisa Lim RN  Body Position:   tilted   left   legs elevated  Skin Protection: incontinence pads utilized  Taken 7/2/2025 2200 by Lisa Lim RN  Body Position:   tilted   right   legs elevated  Skin Protection: incontinence pads utilized  Taken 7/2/2025 2000 by Lisa Lim RN  Body Position:   sitting up in bed   legs elevated  Skin Protection: incontinence pads utilized  Intervention: Prevent and Manage VTE (Venous  Thromboembolism) Risk  Recent Flowsheet Documentation  Taken 7/2/2025 2000 by Lisa Lim RN  VTE Prevention/Management:   bilateral   SCDs (sequential compression devices) off  Intervention: Prevent Infection  Recent Flowsheet Documentation  Taken 7/3/2025 0200 by Lisa Lim RN  Infection Prevention:   environmental surveillance performed   rest/sleep promoted   single patient room provided  Taken 7/3/2025 0000 by Lisa Lim RN  Infection Prevention:   environmental surveillance performed   rest/sleep promoted   single patient room provided  Taken 7/2/2025 2200 by Lisa Lim RN  Infection Prevention:   environmental surveillance performed   rest/sleep promoted   single patient room provided  Taken 7/2/2025 2000 by Lisa Lim RN  Infection Prevention:   environmental surveillance performed   rest/sleep promoted   single patient room provided  Goal: Optimal Comfort and Wellbeing  Outcome: Progressing  Intervention: Provide Person-Centered Care  Recent Flowsheet Documentation  Taken 7/2/2025 2000 by Lisa Lim RN  Trust Relationship/Rapport:   care explained   choices provided   emotional support provided  Goal: Readiness for Transition of Care  Outcome: Progressing  Goal: Plan of Care Review  Outcome: Progressing  Goal: Patient-Specific Goal (Individualized)  Outcome: Progressing  Goal: Absence of Hospital-Acquired Illness or Injury  Outcome: Progressing  Intervention: Identify and Manage Fall Risk  Recent Flowsheet Documentation  Taken 7/3/2025 0200 by Lisa Lim RN  Safety Promotion/Fall Prevention:   safety round/check completed   nonskid shoes/slippers when out of bed   assistive device/personal items within reach   clutter free environment maintained  Taken 7/3/2025 0000 by Lisa Lim RN  Safety Promotion/Fall Prevention:   safety round/check completed   nonskid shoes/slippers when out of bed   assistive device/personal items within reach    clutter free environment maintained  Taken 7/2/2025 2200 by Lisa Lim RN  Safety Promotion/Fall Prevention:   safety round/check completed   nonskid shoes/slippers when out of bed   assistive device/personal items within reach   clutter free environment maintained  Taken 7/2/2025 2000 by Lisa Lim RN  Safety Promotion/Fall Prevention:   safety round/check completed   nonskid shoes/slippers when out of bed   assistive device/personal items within reach   clutter free environment maintained  Intervention: Prevent Skin Injury  Recent Flowsheet Documentation  Taken 7/3/2025 0200 by Lisa Lim RN  Body Position:   supine   legs elevated  Skin Protection: incontinence pads utilized  Taken 7/3/2025 0000 by Lisa Lim RN  Body Position:   tilted   left   legs elevated  Skin Protection: incontinence pads utilized  Taken 7/2/2025 2200 by Lisa Lim RN  Body Position:   tilted   right   legs elevated  Skin Protection: incontinence pads utilized  Taken 7/2/2025 2000 by Lisa Lim RN  Body Position:   sitting up in bed   legs elevated  Skin Protection: incontinence pads utilized  Intervention: Prevent and Manage VTE (Venous Thromboembolism) Risk  Recent Flowsheet Documentation  Taken 7/2/2025 2000 by Lisa Lim RN  VTE Prevention/Management:   bilateral   SCDs (sequential compression devices) off  Intervention: Prevent Infection  Recent Flowsheet Documentation  Taken 7/3/2025 0200 by Lisa Lim RN  Infection Prevention:   environmental surveillance performed   rest/sleep promoted   single patient room provided  Taken 7/3/2025 0000 by Lisa Lim RN  Infection Prevention:   environmental surveillance performed   rest/sleep promoted   single patient room provided  Taken 7/2/2025 2200 by Lisa Lim RN  Infection Prevention:   environmental surveillance performed   rest/sleep promoted   single patient room provided  Taken 7/2/2025 2000 by  Lisa Lim RN  Infection Prevention:   environmental surveillance performed   rest/sleep promoted   single patient room provided  Goal: Optimal Comfort and Wellbeing  Outcome: Progressing  Intervention: Provide Person-Centered Care  Recent Flowsheet Documentation  Taken 7/2/2025 2000 by Lisa Lim RN  Trust Relationship/Rapport:   care explained   choices provided   emotional support provided  Goal: Readiness for Transition of Care  Outcome: Progressing  Goal: Plan of Care Review  Outcome: Progressing  Goal: Patient-Specific Goal (Individualized)  Outcome: Progressing  Goal: Absence of Hospital-Acquired Illness or Injury  Outcome: Progressing  Intervention: Identify and Manage Fall Risk  Recent Flowsheet Documentation  Taken 7/3/2025 0200 by Lisa Lim RN  Safety Promotion/Fall Prevention:   safety round/check completed   nonskid shoes/slippers when out of bed   assistive device/personal items within reach   clutter free environment maintained  Taken 7/3/2025 0000 by Lisa Lim RN  Safety Promotion/Fall Prevention:   safety round/check completed   nonskid shoes/slippers when out of bed   assistive device/personal items within reach   clutter free environment maintained  Taken 7/2/2025 2200 by Lisa Lim RN  Safety Promotion/Fall Prevention:   safety round/check completed   nonskid shoes/slippers when out of bed   assistive device/personal items within reach   clutter free environment maintained  Taken 7/2/2025 2000 by Lisa Lim RN  Safety Promotion/Fall Prevention:   safety round/check completed   nonskid shoes/slippers when out of bed   assistive device/personal items within reach   clutter free environment maintained  Intervention: Prevent Skin Injury  Recent Flowsheet Documentation  Taken 7/3/2025 0200 by Lisa Lim RN  Body Position:   supine   legs elevated  Skin Protection: incontinence pads utilized  Taken 7/3/2025 0000 by Lisa Lim  RN  Body Position:   tilted   left   legs elevated  Skin Protection: incontinence pads utilized  Taken 7/2/2025 2200 by Lisa Lim RN  Body Position:   tilted   right   legs elevated  Skin Protection: incontinence pads utilized  Taken 7/2/2025 2000 by Lisa Lim RN  Body Position:   sitting up in bed   legs elevated  Skin Protection: incontinence pads utilized  Intervention: Prevent and Manage VTE (Venous Thromboembolism) Risk  Recent Flowsheet Documentation  Taken 7/2/2025 2000 by Lisa Lim RN  VTE Prevention/Management:   bilateral   SCDs (sequential compression devices) off  Intervention: Prevent Infection  Recent Flowsheet Documentation  Taken 7/3/2025 0200 by Lisa Lim RN  Infection Prevention:   environmental surveillance performed   rest/sleep promoted   single patient room provided  Taken 7/3/2025 0000 by Lisa Lim RN  Infection Prevention:   environmental surveillance performed   rest/sleep promoted   single patient room provided  Taken 7/2/2025 2200 by Lisa Lim RN  Infection Prevention:   environmental surveillance performed   rest/sleep promoted   single patient room provided  Taken 7/2/2025 2000 by Lisa Lim RN  Infection Prevention:   environmental surveillance performed   rest/sleep promoted   single patient room provided  Goal: Optimal Comfort and Wellbeing  Outcome: Progressing  Intervention: Provide Person-Centered Care  Recent Flowsheet Documentation  Taken 7/2/2025 2000 by Lisa Lim RN  Trust Relationship/Rapport:   care explained   choices provided   emotional support provided  Goal: Readiness for Transition of Care  Outcome: Progressing     Problem: Fall Injury Risk  Goal: Absence of Fall and Fall-Related Injury  Outcome: Progressing  Intervention: Identify and Manage Contributors  Recent Flowsheet Documentation  Taken 7/2/2025 2000 by Lisa Lim RN  Medication Review/Management: medications  reviewed  Intervention: Promote Injury-Free Environment  Recent Flowsheet Documentation  Taken 7/3/2025 0200 by Lisa Lim RN  Safety Promotion/Fall Prevention:   safety round/check completed   nonskid shoes/slippers when out of bed   assistive device/personal items within reach   clutter free environment maintained  Taken 7/3/2025 0000 by Lisa Lim RN  Safety Promotion/Fall Prevention:   safety round/check completed   nonskid shoes/slippers when out of bed   assistive device/personal items within reach   clutter free environment maintained  Taken 7/2/2025 2200 by Lisa Lim RN  Safety Promotion/Fall Prevention:   safety round/check completed   nonskid shoes/slippers when out of bed   assistive device/personal items within reach   clutter free environment maintained  Taken 7/2/2025 2000 by Lisa Lim RN  Safety Promotion/Fall Prevention:   safety round/check completed   nonskid shoes/slippers when out of bed   assistive device/personal items within reach   clutter free environment maintained  Goal: Absence of Fall and Fall-Related Injury  Outcome: Progressing  Intervention: Identify and Manage Contributors  Recent Flowsheet Documentation  Taken 7/2/2025 2000 by Lisa Lim RN  Medication Review/Management: medications reviewed  Intervention: Promote Injury-Free Environment  Recent Flowsheet Documentation  Taken 7/3/2025 0200 by Lisa Lim RN  Safety Promotion/Fall Prevention:   safety round/check completed   nonskid shoes/slippers when out of bed   assistive device/personal items within reach   clutter free environment maintained  Taken 7/3/2025 0000 by Lisa Lim RN  Safety Promotion/Fall Prevention:   safety round/check completed   nonskid shoes/slippers when out of bed   assistive device/personal items within reach   clutter free environment maintained  Taken 7/2/2025 2200 by Lisa Lim RN  Safety Promotion/Fall Prevention:   safety  round/check completed   nonskid shoes/slippers when out of bed   assistive device/personal items within reach   clutter free environment maintained  Taken 7/2/2025 2000 by Lisa Lim RN  Safety Promotion/Fall Prevention:   safety round/check completed   nonskid shoes/slippers when out of bed   assistive device/personal items within reach   clutter free environment maintained     Problem: Skin Injury Risk Increased  Goal: Skin Health and Integrity  Outcome: Progressing  Intervention: Optimize Skin Protection  Recent Flowsheet Documentation  Taken 7/3/2025 0200 by Lisa Lim RN  Activity Management: activity minimized  Pressure Reduction Techniques: frequent weight shift encouraged  Head of Bed (HOB) Positioning: HOB at 30-45 degrees  Pressure Reduction Devices: positioning supports utilized  Skin Protection: incontinence pads utilized  Taken 7/3/2025 0000 by Lisa Lim RN  Activity Management: activity minimized  Pressure Reduction Techniques: frequent weight shift encouraged  Head of Bed (HOB) Positioning: HOB at 30-45 degrees  Pressure Reduction Devices: positioning supports utilized  Skin Protection: incontinence pads utilized  Taken 7/2/2025 2200 by Lisa Lim RN  Activity Management: activity minimized  Pressure Reduction Techniques: frequent weight shift encouraged  Head of Bed (HOB) Positioning: HOB at 30-45 degrees  Pressure Reduction Devices: positioning supports utilized  Skin Protection: incontinence pads utilized  Taken 7/2/2025 2000 by Lisa Lim RN  Activity Management: activity minimized  Pressure Reduction Techniques: weight shift assistance provided  Head of Bed (HOB) Positioning: HOB elevated  Pressure Reduction Devices: positioning supports utilized  Skin Protection: incontinence pads utilized     Problem: Comorbidity Management  Goal: Maintenance of Behavioral Health Symptom Control  Outcome: Progressing  Intervention: Maintain Behavioral Health Symptom  Control  Recent Flowsheet Documentation  Taken 7/2/2025 2000 by Lisa Lim RN  Medication Review/Management: medications reviewed  Goal: Blood Pressure in Desired Range  Outcome: Progressing  Intervention: Maintain Blood Pressure Management  Recent Flowsheet Documentation  Taken 7/2/2025 2000 by Lisa Lim RN  Medication Review/Management: medications reviewed     Problem: Pneumonia  Goal: Fluid Balance  Outcome: Progressing  Intervention: Monitor and Manage Fluid Balance  Recent Flowsheet Documentation  Taken 7/2/2025 2000 by Lisa Lim RN  Fluid/Electrolyte Management: fluids provided  Goal: Absence of Infection Signs and Symptoms  Outcome: Progressing  Intervention: Prevent Infection Progression  Recent Flowsheet Documentation  Taken 7/3/2025 0200 by Lisa Lim RN  Infection Management: aseptic technique maintained  Taken 7/3/2025 0000 by Lisa Lim RN  Infection Management: aseptic technique maintained  Taken 7/2/2025 2200 by Lisa Lim RN  Infection Management: aseptic technique maintained  Taken 7/2/2025 2000 by Lisa Lim RN  Infection Management: aseptic technique maintained  Goal: Effective Oxygenation and Ventilation  Outcome: Progressing  Intervention: Promote Airway Secretion Clearance  Recent Flowsheet Documentation  Taken 7/3/2025 0200 by Lisa Lim RN  Activity Management: activity minimized  Taken 7/3/2025 0000 by Lisa Lim RN  Activity Management: activity minimized  Cough And Deep Breathing: done independently per patient  Taken 7/2/2025 2200 by Lisa Lim RN  Activity Management: activity minimized  Taken 7/2/2025 2000 by Lisa Lim RN  Activity Management: activity minimized  Cough And Deep Breathing: done independently per patient  Intervention: Optimize Oxygenation and Ventilation  Recent Flowsheet Documentation  Taken 7/3/2025 0200 by Lisa Lim RN  Head of Bed (HOB) Positioning: HOB at  30-45 degrees  Taken 7/3/2025 0000 by Lisa Lim RN  Head of Bed (HOB) Positioning: HOB at 30-45 degrees  Taken 7/2/2025 2200 by Lisa Lim RN  Head of Bed (HOB) Positioning: HOB at 30-45 degrees  Taken 7/2/2025 2000 by Lisa Lim RN  Head of Bed (HOB) Positioning: HOB elevated     Problem: Confusion Acute  Goal: Optimal Cognitive Function  Outcome: Progressing  Intervention: Minimize Contributing Factors  Recent Flowsheet Documentation  Taken 7/2/2025 2000 by Lisa Lim RN  Sensory Stimulation Regulation: quiet environment promoted  Reorientation Measures:   clock in view   reorientation provided  Environment Familiarity/Consistency: daily routine followed  Communication Support Strategies: active listening utilized     Problem: Infection  Goal: Absence of Infection Signs and Symptoms  Outcome: Progressing  Intervention: Prevent or Manage Infection  Recent Flowsheet Documentation  Taken 7/3/2025 0200 by Lisa Lim RN  Infection Management: aseptic technique maintained  Taken 7/3/2025 0000 by Lisa Lim RN  Infection Management: aseptic technique maintained  Taken 7/2/2025 2200 by Lisa Lim RN  Infection Management: aseptic technique maintained  Taken 7/2/2025 2000 by Lisa Lim RN  Infection Management: aseptic technique maintained     Problem: Hospitalized Older Adult  Goal: Optimal Cognitive Function  Outcome: Progressing  Intervention: Minimize Contributing Factors  Recent Flowsheet Documentation  Taken 7/2/2025 2000 by Lisa Lim RN  Sensory Stimulation Regulation: quiet environment promoted  Reorientation Measures:   clock in view   reorientation provided  Environment Familiarity/Consistency: daily routine followed  Goal: Effective Bowel Elimination  Outcome: Progressing  Goal: Optimal Coping  Outcome: Progressing  Intervention: Promote Psychosocial Wellbeing  Recent Flowsheet Documentation  Taken 7/2/2025 2000 by Carina  EMMA Gonzalez  Family/Support System Care: support provided  Goal: Fluid and Electrolyte Balance  Outcome: Progressing  Intervention: Monitor and Manage Fluid and Electrolyte Balance  Recent Flowsheet Documentation  Taken 7/2/2025 2000 by Lisa Lim RN  Fluid/Electrolyte Management: fluids provided  Goal: Optimal Functional Ability  Outcome: Progressing  Intervention: Promote Functional Ability  Recent Flowsheet Documentation  Taken 7/3/2025 0200 by Lisa Lim, RN  Activity Management: activity minimized  Taken 7/3/2025 0000 by Lisa Lim RN  Activity Management: activity minimized  Taken 7/2/2025 2200 by Lisa Lim RN  Activity Management: activity minimized  Taken 7/2/2025 2000 by Lisa Lim RN  Activity Management: activity minimized  Goal: Improved Oral Intake  Outcome: Progressing  Goal: Adequate Sleep/Rest  Outcome: Progressing  Goal: Effective Urinary Elimination  Outcome: Progressing   Goal Outcome Evaluation:         VSS on 2L NC.  IV abx continued per MAR.  Potassium replaced during shift.  No acute events during shift.  POC ongoing.  D/C pending.